# Patient Record
Sex: MALE | Race: WHITE | NOT HISPANIC OR LATINO | Employment: OTHER | URBAN - METROPOLITAN AREA
[De-identification: names, ages, dates, MRNs, and addresses within clinical notes are randomized per-mention and may not be internally consistent; named-entity substitution may affect disease eponyms.]

---

## 2018-05-07 PROBLEM — M25.612 SHOULDER STIFFNESS, LEFT: Status: ACTIVE | Noted: 2018-05-07

## 2018-05-07 PROBLEM — G89.29 CHRONIC LEFT SHOULDER PAIN: Status: ACTIVE | Noted: 2018-05-07

## 2018-05-07 PROBLEM — M75.122 COMPLETE ROTATOR CUFF TEAR OF LEFT SHOULDER: Status: ACTIVE | Noted: 2018-05-07

## 2018-05-07 PROBLEM — M25.512 CHRONIC LEFT SHOULDER PAIN: Status: ACTIVE | Noted: 2018-05-07

## 2018-05-22 PROBLEM — E66.3 OVERWEIGHT: Status: ACTIVE | Noted: 2018-05-22

## 2018-06-08 PROBLEM — R73.02 IMPAIRED GLUCOSE TOLERANCE: Status: ACTIVE | Noted: 2018-06-08

## 2018-11-27 PROBLEM — R19.8 RECTAL TENESMUS: Status: ACTIVE | Noted: 2018-11-27

## 2020-03-26 ENCOUNTER — TELEPHONE (OUTPATIENT)
Dept: CARDIOLOGY | Facility: PHYSICIAN GROUP | Age: 65
End: 2020-03-26

## 2020-04-24 ENCOUNTER — APPOINTMENT (OUTPATIENT)
Dept: CARDIOLOGY | Facility: PHYSICIAN GROUP | Age: 65
End: 2020-04-24
Payer: MEDICARE

## 2020-06-05 ENCOUNTER — OFFICE VISIT (OUTPATIENT)
Dept: CARDIOLOGY | Facility: PHYSICIAN GROUP | Age: 65
End: 2020-06-05
Payer: MEDICARE

## 2020-06-05 VITALS
BODY MASS INDEX: 27.31 KG/M2 | OXYGEN SATURATION: 97 % | DIASTOLIC BLOOD PRESSURE: 82 MMHG | HEIGHT: 64 IN | HEART RATE: 64 BPM | SYSTOLIC BLOOD PRESSURE: 130 MMHG | WEIGHT: 160 LBS

## 2020-06-05 DIAGNOSIS — E66.3 OVERWEIGHT: ICD-10-CM

## 2020-06-05 DIAGNOSIS — R01.1 HEART MURMUR: ICD-10-CM

## 2020-06-05 DIAGNOSIS — Z82.49 FAMILY HISTORY OF CORONARY ARTERY DISEASE IN FATHER: ICD-10-CM

## 2020-06-05 DIAGNOSIS — R53.83 OTHER FATIGUE: ICD-10-CM

## 2020-06-05 DIAGNOSIS — Z82.79 FAMILY HISTORY OF BICUSPID AORTIC VALVE: ICD-10-CM

## 2020-06-05 DIAGNOSIS — E78.00 PURE HYPERCHOLESTEROLEMIA: ICD-10-CM

## 2020-06-05 DIAGNOSIS — R73.02 IMPAIRED GLUCOSE TOLERANCE: ICD-10-CM

## 2020-06-05 DIAGNOSIS — R06.09 DOE (DYSPNEA ON EXERTION): ICD-10-CM

## 2020-06-05 DIAGNOSIS — G93.31 POST VIRAL SYNDROME: ICD-10-CM

## 2020-06-05 PROCEDURE — 99204 OFFICE O/P NEW MOD 45 MIN: CPT | Performed by: INTERNAL MEDICINE

## 2020-06-05 RX ORDER — ROSUVASTATIN CALCIUM 40 MG/1
40 TABLET, COATED ORAL DAILY
Qty: 90 TAB | Refills: 3 | Status: SHIPPED | OUTPATIENT
Start: 2020-06-05 | End: 2021-06-23

## 2020-06-05 ASSESSMENT — FIBROSIS 4 INDEX: FIB4 SCORE: 1.47

## 2020-06-05 NOTE — LETTER
[facility]   [address] [place] [address]  Phone: [phone]  Fax: [phone]              Hiro Dempsey  1955    Encounter Date: 2020    Summer Meredith M.D.          PROGRESS NOTE:  Subjective:   Chief Complaint:   Chief Complaint   Patient presents with   • Heart Murmur     Previous patient    • Fatigue   • Shortness of Breath     slightly        Hiro Dempsey is a 65 y.o. male who returns today for hyperlipidemia, impaired fasting glucose, heart murmur.    Previously saw cardiology in 2015 after murmur heard on pre op PE.  Echo  with aortic valve sclerosis.  Normal treadmill stress test in .    Had flu in December, since then significant change.   Used to kite in water for 20 min, now with significant fatigue.  Has to stop when walking 1 mile, has to sit 10 min.  Used to be able to jog then mow the lawn.  Some MORGAN but mostly overwhelming fatigue.  No LE edema, no victoria orthopnea.    He is not limited by chest pain, pressure or tightness with activity.   No significant palpitations, lightheadedness, or presyncope/syncope.   Mild leg claudication when going uphill but more diffuse muscles, likely not claudication.   No stroke/TIA like symptoms.    Has HLP, no meds, , HDL protective.  Has HTN, no meds.  .  No family history of premature coronary artery disease.  Father had bicuspid aortic valve.  MI at 65, CABG with AV replacement. Lived to 80s.  PGF  of heart disease in 60s.    Smoked in high school, quit at 18.  No history of diabetes. Probably IFG.  No history of autoimmune disease such as lupus or rheumatoid arthritis.  No chronic kidney disease.  No ETOH overuse, social only.  Some caffeine overuse, 4-5 cups of tea daily.    , lives in [place].  [place] child, born in [place], grew up [place], [place].  Retired Vet.    DATA REVIEWED by me:  ECG 2015  Sinus, 59, normal ECG    [facility] [place]  3/5/2015  EF 7075%, aortic valve sclerosis, mild concentric LVH    Treadmill stress test [facility] 2/11/2015  Javon protocol, 13 minutes and 30 seconds, normal blood pressure response, no symptoms, no ECG changes    Most recent labs:       Lab Results   Component Value Date/Time    HEMOGLOBIN 15.7 06/27/2018 09:17 AM    HEMATOCRIT 45.9 06/27/2018 09:17 AM    MCV 90.5 06/27/2018 09:17 AM      Lab Results   Component Value Date/Time    SODIUM 143 06/07/2018 08:25 AM    POTASSIUM 4.0 06/07/2018 08:25 AM    CHLORIDE 106 06/07/2018 08:25 AM    CO2 27 06/07/2018 08:25 AM    GLUCOSE 107 (H) 06/07/2018 08:25 AM    BUN 12 06/07/2018 08:25 AM    CREATININE 1.1 06/07/2018 08:25 AM      Lab Results   Component Value Date/Time    ASTSGOT 20 06/07/2018 08:25 AM    ALTSGPT 31 06/07/2018 08:25 AM    ALBUMIN 3.8 06/07/2018 08:25 AM      Lab Results   Component Value Date/Time    CHOLSTRLTOT 232 (H) 06/07/2018 08:25 AM     (H) 06/07/2018 08:25 AM    HDL 44.0 06/07/2018 08:25 AM    TRIGLYCERIDE 97 06/07/2018 08:25 AM           Past Medical History:   Diagnosis Date   • Basal cell carcinoma of skin     Reports skin cancer   • Cancer (HCC)     skin   • GERD (gastroesophageal reflux disease)    • Heart burn    • Heart murmur     denies cp or sob   • History of EKG 10/7/13   • History of gastric ulcer    • Hypoglycemia 2/20/2015   • Pain     Chronic R shoulder pain   • Seasonal allergies    • Seizure (HCC)     X 2 1991, from fever   • Snoring      Past Surgical History:   Procedure Laterality Date   • SHOULDER ARTHROSCOPY W/ ROTATOR CUFF REPAIR Left 4/19/2018    Procedure: LT SHOULDER ARTHROSCOPY,  ARTHROSCOPIC SUBACROMIAL DECOMPRESSION, DISTAL CLAVICLE EXCISION;  Surgeon: Lele Bray M.D.;  Location: [facility];  Service: Orthopedics   • SHOULDER ARTHROSCOPY  10/18/2013    Performed by Lele Bray M.D. at [facility]   • SHOULDER ARTHROTOMY  10/18/2013    Performed by  Lele Bray M.D. at [facility]   • SHOULDER DECOMPRESSION ARTHROSCOPIC  10/18/2013    Performed by Lele Bray M.D. at [facility]   • CLAVICLE DISTAL EXCISION  10/18/2013    Performed by Lele Bray M.D. at SUR[facility]   • TUMOR EXCISION WITH BIOPSY  2013    BASEL CELL   • BASAL CELL EXCISION     • SHOULDER SURGERY  10/18/12   • ULCER DEBRIDEMENT     • CLOSED REDUCTION      TIB FIB     Family History   Problem Relation Age of Onset   • Genetic Disorder Mother    • Arthritis Mother    • Psychiatric Illness Father    • Heart Disease Father         congenital bicuspid heart valve   • Heart Failure Father    • Heart Attack Father 83     Social History     Socioeconomic History   • Marital status:      Spouse name: Alice   • Number of children: 2   • Years of education: Not on file   • Highest education level: Not on file   Occupational History   • Occupation: veterenarian     Employer: [employer]   Social Needs   • Financial resource strain: Not on file   • Food insecurity     Worry: Not on file     Inability: Not on file   • Transportation needs     Medical: Not on file     Non-medical: Not on file   Tobacco Use   • Smoking status: Former Smoker     Packs/day: 0.50     Years: 5.00     Pack years: 2.50     Types: Cigarettes     Last attempt to quit: 10/12/1974     Years since quittin.6   • Smokeless tobacco: Never Used   Substance and Sexual Activity   • Alcohol use: Yes     Alcohol/week: 2.0 oz     Types: 4 Standard drinks or equivalent per week     Comment: 2 /week   • Drug use: No   • Sexual activity: Not on file   Lifestyle   • Physical activity     Days per week: Not on file     Minutes per session: Not on file   • Stress: Not on file   Relationships   • Social connections     Talks on phone: Not on file     Gets together: Not on file     Attends Jew service: Not on file     Active member of club or  "organization: Not on file     Attends meetings of clubs or organizations: Not on file     Relationship status: Not on file   • Intimate partner violence     Fear of current or ex partner: Not on file     Emotionally abused: Not on file     Physically abused: Not on file     Forced sexual activity: Not on file   Other Topics Concern   • Not on file   Social History Narrative   • Not on file     Allergies   Allergen Reactions   • Sulfa Drugs        Current Outpatient Medications   Medication Sig Dispense Refill   • Methylsulfonylmethane (MSM PO) Take  by mouth.     • rosuvastatin (CRESTOR) 40 MG tablet Take 1 Tab by mouth every day. 90 Tab 3   • fluticasone (FLONASE) 50 MCG/ACT nasal spray Spray 1 Spray in nose every day.     • Loratadine (CLARITIN) 10 MG Cap Take  by mouth.       No current facility-administered medications for this visit.        ROS  All others systems reviewed and negative.     Objective:     /82 (BP Location: Left arm, Patient Position: Sitting, BP Cuff Size: Adult)   Pulse 64   Ht 1.626 m (5' 4\")   Wt 72.6 kg (160 lb)   SpO2 97%  Body mass index is 27.46 kg/m².    General: No acute distress. Well nourished.  HEENT: EOM grossly intact, no scleral icterus, no pharyngeal erythema.   Neck:  No JVD, no bruits, trachea midline  CVS: RRR. Normal S1, S2. 2/6 mid peaking syst murmur, No LE edema.  2+ radial pulses, 2+ PT pulses  Resp: CTAB. No wheezing or crackles/rhonchi. Normal respiratory effort.  Abdomen: Soft, NT, no victoria hepatomegaly.  MSK/Ext: No clubbing or cyanosis.  Skin: Warm and dry, no rashes.  Neurological: CN III-XII grossly intact. No focal deficits. Mild [place]  Psych: A&O x 3, appropriate affect, good judgement      Assessment:     1. Heart murmur  EC-ECHOCARDIOGRAM COMPLETE W/O CONT   2. Pure hypercholesterolemia  Lipid Profile   3. Impaired glucose tolerance     4. Overweight     5. Other fatigue  EC-ECHOCARDIOGRAM COMPLETE W/O CONT    TSH WITH REFLEX TO FT4   6. MORGAN (dyspnea " on exertion)  EC-ECHOCARDIOGRAM COMPLETE W/O CONT    EKG    EC-ECHOCARDIOGRAM COMPLETE W/O CONT    Comp Metabolic Panel    TSH WITH REFLEX TO FT4   7. Family history of bicuspid aortic valve  EC-ECHOCARDIOGRAM COMPLETE W/O CONT   8. Family history of coronary artery disease in father     9. Post viral syndrome  CBC WITH DIFFERENTIAL       Medical Decision Making:  Today's Assessment / Status / Plan:     -Blood work, see below  -ECG  -Echo  -Lipids, needs statin, 10 year risk 15%  -Murmur sounds like bicuspid but only mild to mod stenosis at most  -If he has bicuspid, then discussed both kids should be screened    Written instructions given today:      -Echocardiogram- heart pictures to look at the heart structure and pump function. Look for bicuspid aortic valve.    -Fasting blood work, CBC, CMP, lipids, TSH    -I am going to recommend statin for primary prevention of heart attack/stroke.  -Crestor 40 mg daily, stop this medication and call me for myalgias.    -You should always hear results of testing within 5 days with my interpretation, if you do not, call the [place] office: [phone].    -We could consider treadmill stress test in [place].    Return in about 4 weeks (around 7/3/2020).    It is my pleasure to participate in the care of Mr. Dempsey.  Please do not hesitate to contact me with questions or concerns.    Summer Meredith MD, [facility]  Cardiologist [facility]    Please note that this dictation was created using voice recognition software. I have made every reasonable attempt to correct obvious errors, but it is possible there are errors of grammar and possibly content that I did not discover before finalizing the note.      Maria Fernanda Guzman, P.A.-C.  [address]  [place] [address]  VIA In Basket

## 2020-06-05 NOTE — PROGRESS NOTES
Subjective:   Chief Complaint:   Chief Complaint   Patient presents with   • Heart Murmur     Previous patient    • Fatigue   • Shortness of Breath     slightly        Hiro Dempsey is a 65 y.o. male who returns today for hyperlipidemia, impaired fasting glucose, heart murmur.    Previously saw cardiology in 2015 after murmur heard on pre op PE.  Echo 2015 with aortic valve sclerosis.  Normal treadmill stress test in .    Had flu in December, since then significant change.   Used to kite in water for 20 min, now with significant fatigue.  Has to stop when walking 1 mile, has to sit 10 min.  Used to be able to jog then mow the lawn.  Some MORGAN but mostly overwhelming fatigue.  No LE edema, no victoria orthopnea.    He is not limited by chest pain, pressure or tightness with activity.   No significant palpitations, lightheadedness, or presyncope/syncope.   Mild leg claudication when going uphill but more diffuse muscles, likely not claudication.   No stroke/TIA like symptoms.    Has HLP, no meds, , HDL protective.  Has HTN, no meds.  .  No family history of premature coronary artery disease.  Father had bicuspid aortic valve.  MI at 65, CABG with AV replacement. Lived to 80s.  PGF  of heart disease in 60s.    Smoked in high school, quit at 18.  No history of diabetes. Probably IFG.  No history of autoimmune disease such as lupus or rheumatoid arthritis.  No chronic kidney disease.  No ETOH overuse, social only.  Some caffeine overuse, 4-5 cups of tea daily.    , lives in [place].  [place] child, born in [place], grew up on [place], [place].  Retired Vet.    DATA REVIEWED by me:  ECG 2015  Sinus, 59, normal ECG    Echo [place] 3/5/2015  EF 7075%, aortic valve sclerosis, mild concentric LVH    Treadmill stress test [facility] 2015  Javon protocol, 13 minutes and 30 seconds, normal blood pressure response, no symptoms, no ECG changes    Most recent labs:       Lab Results    Component Value Date/Time    HEMOGLOBIN 15.7 06/27/2018 09:17 AM    HEMATOCRIT 45.9 06/27/2018 09:17 AM    MCV 90.5 06/27/2018 09:17 AM      Lab Results   Component Value Date/Time    SODIUM 143 06/07/2018 08:25 AM    POTASSIUM 4.0 06/07/2018 08:25 AM    CHLORIDE 106 06/07/2018 08:25 AM    CO2 27 06/07/2018 08:25 AM    GLUCOSE 107 (H) 06/07/2018 08:25 AM    BUN 12 06/07/2018 08:25 AM    CREATININE 1.1 06/07/2018 08:25 AM      Lab Results   Component Value Date/Time    ASTSGOT 20 06/07/2018 08:25 AM    ALTSGPT 31 06/07/2018 08:25 AM    ALBUMIN 3.8 06/07/2018 08:25 AM      Lab Results   Component Value Date/Time    CHOLSTRLTOT 232 (H) 06/07/2018 08:25 AM     (H) 06/07/2018 08:25 AM    HDL 44.0 06/07/2018 08:25 AM    TRIGLYCERIDE 97 06/07/2018 08:25 AM           Past Medical History:   Diagnosis Date   • Basal cell carcinoma of skin     Reports skin cancer   • Cancer (HCC)     skin   • GERD (gastroesophageal reflux disease)    • Heart burn    • Heart murmur     denies cp or sob   • History of EKG 10/7/13   • History of gastric ulcer    • Hypoglycemia 2/20/2015   • Pain     Chronic R shoulder pain   • Seasonal allergies    • Seizure (HCC)     X 2 1991, from fever   • Snoring      Past Surgical History:   Procedure Laterality Date   • SHOULDER ARTHROSCOPY W/ ROTATOR CUFF REPAIR Left 4/19/2018    Procedure: LT SHOULDER ARTHROSCOPY,  ARTHROSCOPIC SUBACROMIAL DECOMPRESSION, DISTAL CLAVICLE EXCISION;  Surgeon: Lele Bray M.D.;  Location: [facility];  Service: Orthopedics   • SHOULDER ARTHROSCOPY  10/18/2013    Performed by Lele Bray M.D. at [facility]   • SHOULDER ARTHROTOMY  10/18/2013    Performed by Lele Bray M.D. at [facility]   • SHOULDER DECOMPRESSION ARTHROSCOPIC  10/18/2013    Performed by Lele Bray M.D. at [facility]   • CLAVICLE DISTAL EXCISION  10/18/2013    Performed by Lele [facility] M.D. at [facility]  [facility]   • TUMOR EXCISION WITH BIOPSY  2013    BASEL CELL   • BASAL CELL EXCISION     • SHOULDER SURGERY  10/18/12   • ULCER DEBRIDEMENT     • CLOSED REDUCTION      TIB FIB     Family History   Problem Relation Age of Onset   • Genetic Disorder Mother    • Arthritis Mother    • Psychiatric Illness Father    • Heart Disease Father         congenital bicuspid heart valve   • Heart Failure Father    • Heart Attack Father 83     Social History     Socioeconomic History   • Marital status:      Spouse name: Alice   • Number of children: 2   • Years of education: Not on file   • Highest education level: Not on file   Occupational History   • Occupation: veterenarian     Employer: [employer]   Social Needs   • Financial resource strain: Not on file   • Food insecurity     Worry: Not on file     Inability: Not on file   • Transportation needs     Medical: Not on file     Non-medical: Not on file   Tobacco Use   • Smoking status: Former Smoker     Packs/day: 0.50     Years: 5.00     Pack years: 2.50     Types: Cigarettes     Last attempt to quit: 10/12/1974     Years since quittin.6   • Smokeless tobacco: Never Used   Substance and Sexual Activity   • Alcohol use: Yes     Alcohol/week: 2.0 oz     Types: 4 Standard drinks or equivalent per week     Comment: 2 /week   • Drug use: No   • Sexual activity: Not on file   Lifestyle   • Physical activity     Days per week: Not on file     Minutes per session: Not on file   • Stress: Not on file   Relationships   • Social connections     Talks on phone: Not on file     Gets together: Not on file     Attends Jew service: Not on file     Active member of club or organization: Not on file     Attends meetings of clubs or organizations: Not on file     Relationship status: Not on file   • Intimate partner violence     Fear of current or ex partner: Not on file     Emotionally abused: Not on file     Physically abused: Not on file  "    Forced sexual activity: Not on file   Other Topics Concern   • Not on file   Social History Narrative   • Not on file     Allergies   Allergen Reactions   • Sulfa Drugs        Current Outpatient Medications   Medication Sig Dispense Refill   • Methylsulfonylmethane (MSM PO) Take  by mouth.     • rosuvastatin (CRESTOR) 40 MG tablet Take 1 Tab by mouth every day. 90 Tab 3   • fluticasone (FLONASE) 50 MCG/ACT nasal spray Spray 1 Spray in nose every day.     • Loratadine (CLARITIN) 10 MG Cap Take  by mouth.       No current facility-administered medications for this visit.        ROS  All others systems reviewed and negative.     Objective:     /82 (BP Location: Left arm, Patient Position: Sitting, BP Cuff Size: Adult)   Pulse 64   Ht 1.626 m (5' 4\")   Wt 72.6 kg (160 lb)   SpO2 97%  Body mass index is 27.46 kg/m².    General: No acute distress. Well nourished.  HEENT: EOM grossly intact, no scleral icterus, no pharyngeal erythema.   Neck:  No JVD, no bruits, trachea midline  CVS: RRR. Normal S1, S2. 2/6 mid peaking syst murmur, No LE edema.  2+ radial pulses, 2+ PT pulses  Resp: CTAB. No wheezing or crackles/rhonchi. Normal respiratory effort.  Abdomen: Soft, NT, no victoria hepatomegaly.  MSK/Ext: No clubbing or cyanosis.  Skin: Warm and dry, no rashes.  Neurological: CN III-XII grossly intact. No focal deficits. Mild [place]  Psych: A&O x 3, appropriate affect, good judgement      Assessment:     1. Heart murmur  EC-ECHOCARDIOGRAM COMPLETE W/O CONT   2. Pure hypercholesterolemia  Lipid Profile   3. Impaired glucose tolerance     4. Overweight     5. Other fatigue  EC-ECHOCARDIOGRAM COMPLETE W/O CONT    TSH WITH REFLEX TO FT4   6. MORGAN (dyspnea on exertion)  EC-ECHOCARDIOGRAM COMPLETE W/O CONT    EKG    EC-ECHOCARDIOGRAM COMPLETE W/O CONT    Comp Metabolic Panel    TSH WITH REFLEX TO FT4   7. Family history of bicuspid aortic valve  EC-ECHOCARDIOGRAM COMPLETE W/O CONT   8. Family history of coronary artery " disease in father     9. Post viral syndrome  CBC WITH DIFFERENTIAL       Medical Decision Making:  Today's Assessment / Status / Plan:     -Blood work, see below  -ECG  -Echo  -Lipids, needs statin, 10 year risk 15%  -Murmur sounds like bicuspid but only mild to mod stenosis at most  -If he has bicuspid, then discussed both kids should be screened    Written instructions given today:      -Echocardiogram- heart pictures to look at the heart structure and pump function. Look for bicuspid aortic valve.    -Fasting blood work, CBC, CMP, lipids, TSH    -I am going to recommend statin for primary prevention of heart attack/stroke.  -Crestor 40 mg daily, stop this medication and call me for myalgias.    -You should always hear results of testing within 5 days with my interpretation, if you do not, call the [place] office: [phone].    -We could consider treadmill stress test in [place].    Return in about 4 weeks (around 7/3/2020).    It is my pleasure to participate in the care of Mr. Dempsey.  Please do not hesitate to contact me with questions or concerns.    Summer Meredith MD, [facility]  Cardiologist [facility]    Please note that this dictation was created using voice recognition software. I have made every reasonable attempt to correct obvious errors, but it is possible there are errors of grammar and possibly content that I did not discover before finalizing the note.

## 2020-06-05 NOTE — PATIENT INSTRUCTIONS
-Echocardiogram- heart pictures to look at the heart structure and pump function. Look for bicuspid aortic valve.    -Fasting blood work, CBC, CMP, lipids, TSH    -I am going to recommend statin for primary prevention of heart attack/stroke.  -Crestor 40 mg daily, stop this medication and call me for myalgias.    -You should always hear results of testing within 5 days with my interpretation, if you do not, call the [place] office: [phone].    -We could consider treadmill stress test in [place].

## 2020-06-10 ENCOUNTER — TELEPHONE (OUTPATIENT)
Dept: CARDIOLOGY | Facility: MEDICAL CENTER | Age: 65
End: 2020-06-10

## 2020-06-10 DIAGNOSIS — R06.09 DOE (DYSPNEA ON EXERTION): ICD-10-CM

## 2020-06-10 DIAGNOSIS — E66.3 OVERWEIGHT: ICD-10-CM

## 2020-06-10 DIAGNOSIS — E78.00 PURE HYPERCHOLESTEROLEMIA: ICD-10-CM

## 2020-06-10 DIAGNOSIS — Z82.79 FAMILY HISTORY OF BICUSPID AORTIC VALVE: ICD-10-CM

## 2020-06-10 DIAGNOSIS — Z82.49 FAMILY HISTORY OF CORONARY ARTERY DISEASE IN FATHER: ICD-10-CM

## 2020-06-17 ENCOUNTER — ANCILLARY PROCEDURE (OUTPATIENT)
Dept: CARDIOLOGY | Facility: IMAGING CENTER | Age: 65
End: 2020-06-17
Attending: INTERNAL MEDICINE
Payer: MEDICARE

## 2020-06-17 DIAGNOSIS — R06.09 DOE (DYSPNEA ON EXERTION): ICD-10-CM

## 2020-06-17 DIAGNOSIS — R01.1 HEART MURMUR: ICD-10-CM

## 2020-06-17 DIAGNOSIS — Z82.79 FAMILY HISTORY OF BICUSPID AORTIC VALVE: ICD-10-CM

## 2020-06-17 PROCEDURE — 93306 TTE W/DOPPLER COMPLETE: CPT | Performed by: INTERNAL MEDICINE

## 2020-06-19 LAB
LV EJECT FRACT  99904: 65
LV EJECT FRACT MOD 2C 99903: 69.56
LV EJECT FRACT MOD 4C 99902: 61.79
LV EJECT FRACT MOD BP 99901: 66.9

## 2020-06-29 ENCOUNTER — TELEPHONE (OUTPATIENT)
Dept: CARDIOLOGY | Facility: MEDICAL CENTER | Age: 65
End: 2020-06-29

## 2020-06-29 NOTE — TELEPHONE ENCOUNTER
Result Notes for EC-ECHOCARDIOGRAM COMPLETE W/O CONT     Notes recorded by Summer Meredith M.D. on 6/26/2020 at 10:04 PM PDT   Pls let him know echo shows be probably has an abnormal heart valve, bicuspid aortic valve is most likely.  The function of the valve is only midlyd impaired so we don't need to do anything right now but watch it and repeat echo in about 2 years.   Tx,   LS      Pt called. No answer, voicemail left with request for call back and contact information.

## 2020-07-02 NOTE — TELEPHONE ENCOUNTER
Pt returns call.  warm transfers call. Pt given results per MD note. Pt asks for EF #. This is given. Pt confirms 7/8 f/u appt.

## 2020-07-08 ENCOUNTER — OFFICE VISIT (OUTPATIENT)
Dept: CARDIOLOGY | Facility: PHYSICIAN GROUP | Age: 65
End: 2020-07-08
Payer: MEDICARE

## 2020-07-08 VITALS
HEIGHT: 64 IN | HEART RATE: 70 BPM | BODY MASS INDEX: 28 KG/M2 | WEIGHT: 164 LBS | DIASTOLIC BLOOD PRESSURE: 78 MMHG | OXYGEN SATURATION: 95 % | SYSTOLIC BLOOD PRESSURE: 122 MMHG

## 2020-07-08 DIAGNOSIS — R01.1 HEART MURMUR: ICD-10-CM

## 2020-07-08 DIAGNOSIS — E78.00 PURE HYPERCHOLESTEROLEMIA: ICD-10-CM

## 2020-07-08 DIAGNOSIS — R06.09 DOE (DYSPNEA ON EXERTION): ICD-10-CM

## 2020-07-08 DIAGNOSIS — Q23.1 BICUSPID AORTIC VALVE: ICD-10-CM

## 2020-07-08 PROCEDURE — 99214 OFFICE O/P EST MOD 30 MIN: CPT | Performed by: NURSE PRACTITIONER

## 2020-07-08 ASSESSMENT — ENCOUNTER SYMPTOMS
NAUSEA: 0
PND: 0
PALPITATIONS: 0
DIZZINESS: 0
FEVER: 0
COUGH: 0
MYALGIAS: 0
LOSS OF CONSCIOUSNESS: 0
ABDOMINAL PAIN: 0
HEADACHES: 0
ORTHOPNEA: 0
BRUISES/BLEEDS EASILY: 0
CHILLS: 0
INSOMNIA: 0
SHORTNESS OF BREATH: 1

## 2020-07-08 ASSESSMENT — FIBROSIS 4 INDEX: FIB4 SCORE: 1.86

## 2020-07-08 NOTE — PROGRESS NOTES
Chief Complaint   Patient presents with   • Follow-Up   • Heart Murmur   • Hyperlipidemia       Subjective:   Hiro Dempsey is a 65 y.o. male who presents today for one month follow-up of heart murmur and hyperlipidemia.    Hiro is a 65 year old male with history of heart murmur and hyperlipidemia, previously followed by Dr. Pineda, and most recently seen by Dr. Meredith a month ago. Echocardiogram was ordered, and Crestor 40mg once daily was added for primary prevention.    He is here today for follow-up. He is tolerating the Crestor without any problems. No chest pain, pressure or discomfort; occasional very mild shortness of breath when he exerts himself, but no orthopnea or PND; no symptomatic palpitations. No dizziness or syncope; no LE edema. BP is stable.     Past Medical History:   Diagnosis Date   • Basal cell carcinoma of skin     Reports skin cancer   • Bicuspid aortic valve 06/2020    Echocardiogram with normal LV size, LVEF 65%. Normal RA, LA and RV. Trace MR. Biscuspid aortic valve with mild AS (peak 23mmHg, mean 14mmHg, Vmax 2.4m/s, MAL 1.6cm2).    • Cancer (HCC)     skin   • GERD (gastroesophageal reflux disease)    • Heart burn    • Heart murmur     denies cp or sob   • History of EKG 10/7/13   • History of gastric ulcer    • Hypoglycemia 2/20/2015   • Pain     Chronic R shoulder pain   • Seasonal allergies    • Seizure (HCC)     X 2 1991, from fever   • Snoring      Past Surgical History:   Procedure Laterality Date   • SHOULDER ARTHROSCOPY W/ ROTATOR CUFF REPAIR Left 4/19/2018    Procedure: LT SHOULDER ARTHROSCOPY,  ARTHROSCOPIC SUBACROMIAL DECOMPRESSION, DISTAL CLAVICLE EXCISION;  Surgeon: Lele Bray M.D.;  Location: [facility];  Service: Orthopedics   • SHOULDER ARTHROSCOPY  10/18/2013    Performed by Lele Bray M.D. at [facility]   • SHOULDER ARTHROTOMY  10/18/2013    Performed by Lele Bray M.D. at [facility]   • SHOULDER  DECOMPRESSION ARTHROSCOPIC  10/18/2013    Performed by Lele Bray M.D. at [facility]   • CLAVICLE DISTAL EXCISION  10/18/2013    Performed by Lele Bray M.D. at SUR[facility]ERY [facility]   • TUMOR EXCISION WITH BIOPSY  2013    BASEL CELL   • BASAL CELL EXCISION     • SHOULDER SURGERY  10/18/12   • ULCER DEBRIDEMENT     • CLOSED REDUCTION      TIB FIB     Family History   Problem Relation Age of Onset   • Genetic Disorder Mother    • Arthritis Mother    • Psychiatric Illness Father    • Heart Disease Father         congenital bicuspid heart valve   • Heart Failure Father    • Heart Attack Father 83     Social History     Socioeconomic History   • Marital status:      Spouse name: Alice   • Number of children: 2   • Years of education: Not on file   • Highest education level: Not on file   Occupational History   • Occupation: veterenarian     Employer: [employer]   Social Needs   • Financial resource strain: Not on file   • Food insecurity     Worry: Not on file     Inability: Not on file   • Transportation needs     Medical: Not on file     Non-medical: Not on file   Tobacco Use   • Smoking status: Former Smoker     Packs/day: 0.50     Years: 5.00     Pack years: 2.50     Types: Cigarettes     Last attempt to quit: 10/12/1974     Years since quittin.7   • Smokeless tobacco: Never Used   Substance and Sexual Activity   • Alcohol use: Yes     Alcohol/week: 2.0 oz     Types: 4 Standard drinks or equivalent per week     Comment: 2 /week   • Drug use: No   • Sexual activity: Not on file   Lifestyle   • Physical activity     Days per week: Not on file     Minutes per session: Not on file   • Stress: Not on file   Relationships   • Social connections     Talks on phone: Not on file     Gets together: Not on file     Attends Mosque service: Not on file     Active member of club or organization: Not on file     Attends meetings of clubs or  "organizations: Not on file     Relationship status: Not on file   • Intimate partner violence     Fear of current or ex partner: Not on file     Emotionally abused: Not on file     Physically abused: Not on file     Forced sexual activity: Not on file   Other Topics Concern   • Not on file   Social History Narrative   • Not on file     Allergies   Allergen Reactions   • Sulfa Drugs      Outpatient Encounter Medications as of 7/8/2020   Medication Sig Dispense Refill   • rosuvastatin (CRESTOR) 40 MG tablet Take 1 Tab by mouth every day. 90 Tab 3   • fluticasone (FLONASE) 50 MCG/ACT nasal spray Spray 1 Spray in nose every day.     • Loratadine (CLARITIN) 10 MG Cap Take  by mouth.     • [DISCONTINUED] Methylsulfonylmethane (MSM PO) Take  by mouth.       No facility-administered encounter medications on file as of 7/8/2020.      Review of Systems   Constitutional: Negative for chills and fever.   HENT: Negative for congestion.    Respiratory: Positive for shortness of breath. Negative for cough.         Very mild, with exertion.   Cardiovascular: Negative for chest pain, palpitations, orthopnea, leg swelling and PND.   Gastrointestinal: Negative for abdominal pain and nausea.   Musculoskeletal: Negative for myalgias.   Skin: Negative for rash.   Neurological: Negative for dizziness, loss of consciousness and headaches.   Endo/Heme/Allergies: Does not bruise/bleed easily.   Psychiatric/Behavioral: The patient does not have insomnia.         Objective:   /78 (BP Location: Left arm, Patient Position: Sitting)   Pulse 70   Ht 1.626 m (5' 4\")   Wt 74.4 kg (164 lb)   SpO2 95%   BMI 28.15 kg/m²     Physical Exam   Constitutional: He is oriented to person, place, and time. He appears well-developed and well-nourished.   HENT:   Head: Normocephalic.   Eyes: EOM are normal.   Neck: Normal range of motion. Neck supple. No JVD present.   Cardiovascular: Normal rate and regular rhythm.   Murmur heard.   Systolic murmur is " present with a grade of 2/6.  Murmur at RUSB.   Pulmonary/Chest: Effort normal and breath sounds normal. No respiratory distress. He has no wheezes. He has no rales.   Abdominal: Soft. Bowel sounds are normal. He exhibits no distension. There is no abdominal tenderness.   Musculoskeletal: Normal range of motion.         General: No edema.   Neurological: He is alert and oriented to person, place, and time.   Skin: Skin is warm and dry. No rash noted.   Psychiatric: He has a normal mood and affect.     CONCLUSIONS OF ECHOCARDIOGRAM OF 6/17/2020:  No prior study is available for comparison.   Left ventricular ejection fraction is visually estimated to be 65%.  Aortic valve leaflets are calcified, cannot exclude bicuspid/quadcuspid   valve.  Mild aortic stenosis: Vmax is 2.4  m/s, MG 14 mmHg, DI 0.48, MAL 1.6   cm2.  Mild aortic insufficiency.  Ascending aorta diameter is 3.7 cm.  Unable to estimate RVSP, normal estimated RAP.    Lab Results   Component Value Date/Time    CHOLSTRLTOT 180 06/10/2020 08:58 AM     (H) 06/10/2020 08:58 AM    HDL 46.0 06/10/2020 08:58 AM    TRIGLYCERIDE 58 06/10/2020 08:58 AM       Lab Results   Component Value Date/Time    SODIUM 141 06/10/2020 08:58 AM    POTASSIUM 4.2 06/10/2020 08:58 AM    CHLORIDE 109 (H) 06/10/2020 08:58 AM    CO2 26 06/10/2020 08:58 AM    GLUCOSE 113 (H) 06/10/2020 08:58 AM    BUN 11 06/10/2020 08:58 AM    CREATININE 1.1 06/10/2020 08:58 AM     Lab Results   Component Value Date/Time    ALKPHOSPHAT 58 06/10/2020 08:58 AM    ASTSGOT 27 06/10/2020 08:58 AM    ALTSGPT 33 06/10/2020 08:58 AM    TBILIRUBIN 0.7 06/10/2020 08:58 AM        Assessment:     1. MORGAN (dyspnea on exertion)  DX-CHEST-2 VIEWS   2. Bicuspid aortic valve     3. Heart murmur     4. Pure hypercholesterolemia         Medical Decision Making:  Today's Assessment / Status / Plan:     1. Mild dyspnea on exertion, with bicuspid aortic valve with mild AS. Will check every 2 years, unless new,  progressive symptoms arise. Reviewed these with patient.    2. Heart murmur, related to mild AS, stable.    3. Hyperlipidemia, treated with Crestor 40mg once daily. To repeat labs in 2 months. Will make changes based on these results.    Same medications for now. FU with Dr. Meredith in 6-12 months, sooner if clinical condition changes.    Collaborating MD: Masoud

## 2020-07-13 ENCOUNTER — TELEPHONE (OUTPATIENT)
Dept: CARDIOLOGY | Facility: MEDICAL CENTER | Age: 65
End: 2020-07-13

## 2020-07-13 NOTE — TELEPHONE ENCOUNTER
Called pt. To advise.       Message   Received: Today   Message Contents   TIO Blake L.P.N.               Please let pt know that CXR is completely normal - good news.   Thanks, AB

## 2020-07-13 NOTE — TELEPHONE ENCOUNTER
AB/jeremy    Pt calling for thoracic x ray results done at [facility] 7/10. Please call Hiro  or , ok to talk to Alice, she has permission to speak on Hiro's behalf.

## 2020-09-15 ENCOUNTER — TELEPHONE (OUTPATIENT)
Dept: CARDIOLOGY | Facility: MEDICAL CENTER | Age: 65
End: 2020-09-15

## 2020-12-01 ENCOUNTER — TELEPHONE (OUTPATIENT)
Dept: CARDIOLOGY | Facility: MEDICAL CENTER | Age: 65
End: 2020-12-01

## 2020-12-01 NOTE — TELEPHONE ENCOUNTER
AB    AW  TO: 430p/Mon/Office  NM: Hiro Garcia   PH: [phone]   PT NM: Hiro Garcia   : [birth year]   REG DR: Dr Meredith   RE: Needs to know what type of  appointment needs to be scheduled.  Needs to have blood work done prior.

## 2020-12-01 NOTE — TELEPHONE ENCOUNTER
Kasia's 7-8-20 OV dictation noted that pt. Can schedule FV in 6-12 months with Dr. Meredith.   Dr. Meredith ordered a LP in June which hasn't been drawn yet.  Left detailed message for pt. To call [phone] to schedule FV with LS in [place] or [facility] and that lab order will be mailed to him..

## 2021-01-27 ENCOUNTER — TELEPHONE (OUTPATIENT)
Dept: CARDIOLOGY | Facility: MEDICAL CENTER | Age: 66
End: 2021-01-27

## 2021-01-27 NOTE — TELEPHONE ENCOUNTER
Summer Meredith M.D.  Beti Marin, R.N.             M,   Please let him know his LDL cholesterol is down to 61 which is excellent.  Thank you.      LVM on pt's personal VM informing him of results.

## 2021-06-23 RX ORDER — ROSUVASTATIN CALCIUM 40 MG/1
TABLET, COATED ORAL
Qty: 90 TABLET | Refills: 3 | Status: SHIPPED | OUTPATIENT
Start: 2021-06-23 | End: 2022-06-08 | Stop reason: SDUPTHER

## 2021-07-28 ENCOUNTER — OFFICE VISIT (OUTPATIENT)
Dept: CARDIOLOGY | Facility: PHYSICIAN GROUP | Age: 66
End: 2021-07-28
Payer: MEDICARE

## 2021-07-28 VITALS
WEIGHT: 153 LBS | SYSTOLIC BLOOD PRESSURE: 112 MMHG | HEART RATE: 50 BPM | HEIGHT: 64 IN | OXYGEN SATURATION: 97 % | DIASTOLIC BLOOD PRESSURE: 60 MMHG | BODY MASS INDEX: 26.12 KG/M2

## 2021-07-28 DIAGNOSIS — R01.1 HEART MURMUR: ICD-10-CM

## 2021-07-28 DIAGNOSIS — E78.00 PURE HYPERCHOLESTEROLEMIA: ICD-10-CM

## 2021-07-28 DIAGNOSIS — Z82.79 FAMILY HISTORY OF BICUSPID AORTIC VALVE: ICD-10-CM

## 2021-07-28 DIAGNOSIS — Z82.49 FAMILY HISTORY OF CORONARY ARTERY DISEASE IN FATHER: ICD-10-CM

## 2021-07-28 DIAGNOSIS — G93.31 POST VIRAL SYNDROME: ICD-10-CM

## 2021-07-28 DIAGNOSIS — R73.02 IMPAIRED GLUCOSE TOLERANCE: ICD-10-CM

## 2021-07-28 DIAGNOSIS — Q23.1 BICUSPID AORTIC VALVE: ICD-10-CM

## 2021-07-28 DIAGNOSIS — R05.3 CHRONIC COUGH: ICD-10-CM

## 2021-07-28 PROCEDURE — 99214 OFFICE O/P EST MOD 30 MIN: CPT | Performed by: INTERNAL MEDICINE

## 2021-07-28 RX ORDER — ROSUVASTATIN CALCIUM 40 MG/1
TABLET, COATED ORAL
COMMUNITY
Start: 2021-06-23 | End: 2021-07-28

## 2021-07-28 RX ORDER — CLOTRIMAZOLE AND BETAMETHASONE DIPROPIONATE 10; .64 MG/G; MG/G
CREAM TOPICAL
COMMUNITY
Start: 2021-07-24 | End: 2021-12-28

## 2021-07-28 RX ORDER — OFLOXACIN 3 MG/ML
10 SOLUTION AURICULAR (OTIC) DAILY
COMMUNITY
Start: 2021-07-24 | End: 2021-07-31

## 2021-07-28 RX ORDER — OFLOXACIN 3 MG/ML
SOLUTION AURICULAR (OTIC)
COMMUNITY
Start: 2021-07-27 | End: 2021-07-28

## 2021-07-28 RX ORDER — ALBUTEROL SULFATE 90 UG/1
2 AEROSOL, METERED RESPIRATORY (INHALATION) EVERY 6 HOURS PRN
Qty: 8.5 G | Refills: 1 | Status: SHIPPED
Start: 2021-07-28 | End: 2022-04-26

## 2021-07-28 RX ORDER — COLISTIN SULFATE, NEOMYCIN SULFATE, THONZONIUM BROMIDE AND HYDROCORTISONE ACETATE 3; 3.3; .5; 1 MG/ML; MG/ML; MG/ML; MG/ML
SUSPENSION AURICULAR (OTIC)
COMMUNITY
Start: 2021-07-26 | End: 2021-12-28

## 2021-07-28 ASSESSMENT — FIBROSIS 4 INDEX: FIB4 SCORE: 1.64

## 2021-07-28 NOTE — PATIENT INSTRUCTIONS
-I hear a murmur and your aortic valve does not look normal.  I cannot tell if it is a bicuspid aortic valve or even quadricuspid aortic valve.  Someday we will perform a transesophageal echocardiogram, AKA ORA, this will get the probe closer to your heart so we can see the valve better.  This can be done with deep sedation with an anesthesiologist or moderate sedation with Versed and fentanyl.  Your choice and I can arrange it either way.  We are not in a hurry.    -The valve is functioning sufficiently so there is nothing to be done right now except keep checking echos and watch for symptoms.  I can also keep listening to your heart valve every time I see you.    -Someday if the valve is stuck down (aortic stenosis) or leaks too much (aortic regurgitation) we will get you a new heart valve.    -Symptoms would include slowing down, shortness of breath activity but feeling fine at rest, lightheadedness, chest pains.  Our goal is to monitor you closely so you never get as sick as her father was when he got his new heart valve.    -At some point you will want your adult children to be screened, listening for murmur and eventually an echocardiogram.  Do this when they are well-established in life and have their health insurance and long-term disability lined up.    -Here is my unsolicited advice about your cough:  -Use a nasal steroid spray every day such as Flonase, any over-the-counter  -Try albuterol.  Technically albuterol as a rescue inhaler.  See if it impacts your cough in any way.  You can use it up to 4 times a day.  -Ras KNOWLES is a small packet of essentially salt that you add with distilled water into a spray and clean out your entire sinus passage.  This is been extremely effective for people with cough and allergies.

## 2021-07-28 NOTE — PROGRESS NOTES
Subjective:   Chief Complaint:   Chief Complaint   Patient presents with   • Heart Murmur     F/V DX:Heart murmur   • Shortness of Breath       Hiro Dempsey is a 66 y.o. male who returns today for hyperlipidemia, impaired fasting glucose, heart murmur and a chronic cough.  Previously saw cardiology in  after murmur heard on pre op PE.  Echo  with aortic valve sclerosis.  Normal treadmill stress test in .    Had flu in 2019 (did not have flu shot), since then significant change.   Used to kite in water for 20 min, now with significant fatigue.  Has to stop when walking 1 mile, has to sit 10 min.  Used to be able to jog then mow the lawn.  Some MORGAN but mostly overwhelming fatigue.  Having a chronic cough.  No LE edema, no victoria orthopnea.    He is not limited by chest pain, pressure or tightness with activity.   No significant palpitations, lightheadedness, or presyncope/syncope.   Mild leg claudication when going uphill but more diffuse muscles, likely not claudication.   No stroke/TIA like symptoms.    Has HLP, no meds, , HDL protective.  On statin, LDL dropped to 61.    No HTN, no meds.    No family history of premature coronary artery disease.  Father had bicuspid aortic valve.  MI at 65, CABG with AV replacement. Lived to 80s.  PGF  of heart disease in 60s.    Smoked in high school, quit at 18.  No history of diabetes. Probably IFG.  No history of autoimmune disease such as lupus or rheumatoid arthritis.  No chronic kidney disease.  No ETOH overuse, social only.  Some caffeine overuse, 4-5 cups of tea daily.    , lives in [place].  [place] child, born in [place], grew up on [place].  Retired [employer].    DATA REVIEWED by me:  ECG 2015  Sinus, 59, normal ECG    Echo 2020  No prior study is available for comparison.   Left ventricular ejection fraction is visually estimated to be 65%.  Aortic valve leaflets are calcified, cannot exclude bicuspid/quadcuspid   valve.  Mild  aortic stenosis: Vmax is 2.4  m/s, MG 14 mmHg, DI 0.48, MAL 1.6   cm2.  Mild aortic insufficiency.  Ascending aorta diameter is 3.7 cm.  Unable to estimate RVSP, normal estimated RAP.    Echo [place] 3/5/2015  EF 70-75%, aortic valve sclerosis, mild concentric LVH    Treadmill stress test [facility] 2/11/2015  Javon protocol, 13 minutes and 30 seconds, normal blood pressure response, no symptoms, no ECG changes    Most recent labs:     7 2621 sodium 139, LFTs normal, creatinine 0.92, hemoglobin 14, platelets 162  Lab Results   Component Value Date/Time    HEMOGLOBIN 15.5 06/10/2020 12:00 AM    HEMATOCRIT 46.4 06/10/2020 12:00 AM    MCV 93.2 06/10/2020 12:00 AM      Lab Results   Component Value Date/Time    SODIUM 141 06/10/2020 08:58 AM    POTASSIUM 4.2 06/10/2020 08:58 AM    CHLORIDE 109 (H) 06/10/2020 08:58 AM    CO2 26 06/10/2020 08:58 AM    GLUCOSE 113 (H) 06/10/2020 08:58 AM    BUN 11 06/10/2020 08:58 AM    CREATININE 1.1 06/10/2020 08:58 AM      Lab Results   Component Value Date/Time    ASTSGOT 28 09/11/2020 12:00 PM    ALTSGPT 47 09/11/2020 12:00 PM    ALBUMIN 4.1 09/11/2020 12:00 PM      Lab Results   Component Value Date/Time    CHOLSTRLTOT 123 01/25/2021 11:22 AM    LDL 61 01/25/2021 11:22 AM    HDL 51.0 01/25/2021 11:22 AM    TRIGLYCERIDE 55 01/25/2021 11:22 AM           Past Medical History:   Diagnosis Date   • Basal cell carcinoma of skin     Reports skin cancer   • Bicuspid aortic valve 06/2020    Echocardiogram with normal LV size, LVEF 65%. Normal RA, LA and RV. Trace MR. Biscuspid aortic valve with mild AS (peak 23mmHg, mean 14mmHg, Vmax 2.4m/s, MAL 1.6cm2).    • Cancer (HCC)     skin   • GERD (gastroesophageal reflux disease)    • Heart burn    • Heart murmur     denies cp or sob   • History of EKG 10/7/13   • History of gastric ulcer    • Hypoglycemia 2/20/2015   • Pain     Chronic R shoulder pain   • Seasonal allergies    • Seizure (HCC)     X 2 1991, from fever   •  Snoring      Past Surgical History:   Procedure Laterality Date   • SHOULDER ARTHROSCOPY W/ ROTATOR CUFF REPAIR Left 2018    Procedure: LT SHOULDER ARTHROSCOPY,  ARTHROSCOPIC SUBACROMIAL DECOMPRESSION, DISTAL CLAVICLE EXCISION;  Surgeon: Lele Bray M.D.;  Location: [facility];  Service: Orthopedics   • SHOULDER ARTHROSCOPY  10/18/2013    Performed by Lele Bray M.D. at [facility]   • SHOULDER ARTHROTOMY  10/18/2013    Performed by Lele Bray M.D. at [facility]   • SHOULDER DECOMPRESSION ARTHROSCOPIC  10/18/2013    Performed by Lele Bray M.D. at [facility]   • CLAVICLE DISTAL EXCISION  10/18/2013    Performed by Lele Bray M.D. at [facility]   • TUMOR EXCISION WITH BIOPSY  2013    BASEL CELL   • BASAL CELL EXCISION     • SHOULDER SURGERY  10/18/12   • ULCER DEBRIDEMENT     • CLOSED REDUCTION      TIB FIB     Family History   Problem Relation Age of Onset   • Genetic Disorder Mother    • Arthritis Mother    • Psychiatric Illness Father    • Heart Disease Father         congenital bicuspid heart valve   • Heart Failure Father    • Heart Attack Father 83     Social History     Socioeconomic History   • Marital status:      Spouse name: Alice   • Number of children: 2   • Years of education: Not on file   • Highest education level: Not on file   Occupational History   • Occupation: veterenarian     Employer: [employer]   Tobacco Use   • Smoking status: Former Smoker     Packs/day: 0.50     Years: 5.00     Pack years: 2.50     Types: Cigarettes     Quit date: 10/12/1974     Years since quittin.8   • Smokeless tobacco: Never Used   Substance and Sexual Activity   • Alcohol use: Yes     Alcohol/week: 2.0 oz     Types: 4 Standard drinks or equivalent per week     Comment: 2 /week   • Drug use: No   • Sexual activity: Not on file   Other Topics Concern   • Not on  file   Social History Narrative   • Not on file     Social Determinants of Health     Financial Resource Strain:    • Difficulty of Paying Living Expenses:    Food Insecurity:    • Worried About Running Out of Food in the Last Year:    • Ran Out of Food in the Last Year:    Transportation Needs:    • Lack of Transportation (Medical):    • Lack of Transportation (Non-Medical):    Physical Activity:    • Days of Exercise per Week:    • Minutes of Exercise per Session:    Stress:    • Feeling of Stress :    Social Connections:    • Frequency of Communication with Friends and Family:    • Frequency of Social Gatherings with Friends and Family:    • Attends Religion Services:    • Active Member of Clubs or Organizations:    • Attends Club or Organization Meetings:    • Marital Status:    Intimate Partner Violence:    • Fear of Current or Ex-Partner:    • Emotionally Abused:    • Physically Abused:    • Sexually Abused:      Allergies   Allergen Reactions   • Sulfa Drugs        Current Outpatient Medications   Medication Sig Dispense Refill   • clotrimazole-betamethasone (LOTRISONE) 1-0.05 % Cream Apply  topically.     • neomycin/colistin/thonz/HC (CORTISPORIN-TC) 3.3-3-10-0.5 MG/ML Suspension Instill 5 gtts in affected ear three to four time daily for 10 days.     • ofloxacin otic sol (FLOXIN OTIC) 0.3 % Solution Administer 10 Drops into affected ear(s) every day.     • albuterol 108 (90 Base) MCG/ACT Aero Soln inhalation aerosol Inhale 2 Puffs every 6 hours as needed for Shortness of Breath. 8.5 g 1   • rosuvastatin (CRESTOR) 40 MG tablet TAKE ONE TABLET BY MOUTH DAILY 90 tablet 3   • fluticasone (FLONASE) 50 MCG/ACT nasal spray Spray 1 Spray in nose every day.     • Loratadine (CLARITIN) 10 MG Cap Take  by mouth.       No current facility-administered medications for this visit.       ROS    All others systems reviewed and negative.     Objective:     /60 (BP Location: Left arm, Patient Position: Sitting, BP  "Cuff Size: Adult)   Pulse (!) 50   Ht 1.626 m (5' 4\")   Wt 69.4 kg (153 lb)   SpO2 97%  Body mass index is 26.26 kg/m².    General: No acute distress. Well nourished.  HEENT: EOM grossly intact, no scleral icterus, no pharyngeal erythema.   Neck:  No JVD, no bruits, trachea midline  CVS: RRR. Normal S1, S2. 2/6 early to mid peaking syst murmur, No LE edema.  2+ radial pulses, 2+ PT pulses  Resp: CTAB. No wheezing or crackles/rhonchi. Normal respiratory effort.  Abdomen: Soft, NT, no victoria hepatomegaly.  MSK/Ext: No clubbing or cyanosis.  Skin: Warm and dry, no rashes.  Neurological: CN III-XII grossly intact. No focal deficits. Mild [place]  Psych: A&O x 3, appropriate affect, good judgement    Physical exam performed today and unchanged, except what is noted, compared to 6-5-2020    Assessment:     1. Bicuspid aortic valve  EC-ECHOCARDIOGRAM COMPLETE W/O CONT   2. Heart murmur  EC-ECHOCARDIOGRAM COMPLETE W/O CONT   3. Pure hypercholesterolemia     4. Family history of bicuspid aortic valve     5. Family history of coronary artery disease in father     6. Impaired glucose tolerance     7. Post viral syndrome     8. Chronic cough         Medical Decision Making:  Today's Assessment / Status / Plan:     -Working on his cough, I will give him albuterol and he can report back to Dr. Bowling if this is helping  -No significant shortness of breath  -His fatigue has mostly resolved  -Lipids, needs statin, 10 year risk 15%, on primary prevention statin, LDL dropped from 122 down to 61.  -Looks like bicuspid or quadricuspid aortic valve, will repeat echo in 2 years, June 2022  -I would recommend his children be screened at some point  -Return in 1 year with echo just prior    Written instructions given today:      -I hear a murmur and your aortic valve does not look normal.  I cannot tell if it is a bicuspid aortic valve or even quadricuspid aortic valve.  Someday we will perform a transesophageal echocardiogram, AKA ORA, " this will get the probe closer to your heart so we can see the valve better.  This can be done with deep sedation with an anesthesiologist or moderate sedation with Versed and fentanyl.  Your choice and I can arrange it either way.  We are not in a hurry.    -The valve is functioning sufficiently so there is nothing to be done right now except keep checking echos and watch for symptoms.  I can also keep listening to your heart valve every time I see you.    -Someday if the valve is stuck down (aortic stenosis) or leaks too much (aortic regurgitation) we will get you a new heart valve.    -Symptoms would include slowing down, shortness of breath activity but feeling fine at rest, lightheadedness, chest pains.  Our goal is to monitor you closely so you never get as sick as her father was when he got his new heart valve.    -At some point you will want your adult children to be screened, listening for murmur and eventually an echocardiogram.  Do this when they are well-established in life and have their health insurance and long-term disability lined up.    -Here is my unsolicited advice about your cough:  -Use a nasal steroid spray every day such as Flonase, any over-the-counter  -Try albuterol.  Technically albuterol as a rescue inhaler.  See if it impacts your cough in any way.  You can use it up to 4 times a day.  -Ras KNOWLES is a small packet of essentially salt that you add with distilled water into a spray and clean out your entire sinus passage.  This is been extremely effective for people with cough and allergies.        Return in about 1 year (around 7/28/2022).    It is my pleasure to participate in the care of Mr. Dempsey.  Please do not hesitate to contact me with questions or concerns.    Summer Meredith MD, [facility]  Cardiologist [facility]    Please note that this dictation was created using voice recognition software. I have made every reasonable attempt to correct obvious  errors, but it is possible there are errors of grammar and possibly content that I did not discover before finalizing the note.

## 2021-07-28 NOTE — LETTER
[facility]   [address] [place] [address]  Phone: [phone]  Fax: [phone]              Hiro Dempsey  1955    Encounter Date: 2021    Summer Meredith M.D.          PROGRESS NOTE:  Subjective:   Chief Complaint:   Chief Complaint   Patient presents with   • Heart Murmur     F/V DX:Heart murmur   • Shortness of Breath       Hiro Dempsey is a 66 y.o. male who returns today for hyperlipidemia, impaired fasting glucose, heart murmur and a chronic cough.  Previously saw cardiology in 2015 after murmur heard on pre op PE.  Echo  with aortic valve sclerosis.  Normal treadmill stress test in .    Had flu in 2019 (did not have flu shot), since then significant change.   Used to kite in water for 20 min, now with significant fatigue.  Has to stop when walking 1 mile, has to sit 10 min.  Used to be able to jog then mow the lawn.  Some MORGAN but mostly overwhelming fatigue.  Having a chronic cough.  No LE edema, no victoria orthopnea.    He is not limited by chest pain, pressure or tightness with activity.   No significant palpitations, lightheadedness, or presyncope/syncope.   Mild leg claudication when going uphill but more diffuse muscles, likely not claudication.   No stroke/TIA like symptoms.    Has HLP, no meds, , HDL protective.  On statin, LDL dropped to 61.    No HTN, no meds.    No family history of premature coronary artery disease.  Father had bicuspid aortic valve.  MI at 65, CABG with AV replacement. Lived to 80s.  PGF  of heart disease in 60s.    Smoked in high school, quit at 18.  No history of diabetes. Probably IFG.  No history of autoimmune disease such as lupus or rheumatoid arthritis.  No chronic kidney disease.  No ETOH overuse, social only.  Some caffeine overuse, 4-5 cups of tea daily.    , lives in [place].  Rosalia child, born in [place], grew up on [place], [place].  Retired  [employer].    DATA REVIEWED by me:  ECG 2/11/2015  Sinus, 59, normal ECG    Echo 6-  No prior study is available for comparison.   Left ventricular ejection fraction is visually estimated to be 65%.  Aortic valve leaflets are calcified, cannot exclude bicuspid/quadcuspid   valve.  Mild aortic stenosis: Vmax is 2.4  m/s, MG 14 mmHg, DI 0.48, MAL 1.6   cm2.  Mild aortic insufficiency.  Ascending aorta diameter is 3.7 cm.  Unable to estimate RVSP, normal estimated RAP.    Echo Alpha 3/5/2015  EF 70-75%, aortic valve sclerosis, mild concentric LVH    Treadmill stress test [facility] 2/11/2015  Javon protocol, 13 minutes and 30 seconds, normal blood pressure response, no symptoms, no ECG changes    Most recent labs:     7 2621 sodium 139, LFTs normal, creatinine 0.92, hemoglobin 14, platelets 162  Lab Results   Component Value Date/Time    HEMOGLOBIN 15.5 06/10/2020 12:00 AM    HEMATOCRIT 46.4 06/10/2020 12:00 AM    MCV 93.2 06/10/2020 12:00 AM      Lab Results   Component Value Date/Time    SODIUM 141 06/10/2020 08:58 AM    POTASSIUM 4.2 06/10/2020 08:58 AM    CHLORIDE 109 (H) 06/10/2020 08:58 AM    CO2 26 06/10/2020 08:58 AM    GLUCOSE 113 (H) 06/10/2020 08:58 AM    BUN 11 06/10/2020 08:58 AM    CREATININE 1.1 06/10/2020 08:58 AM      Lab Results   Component Value Date/Time    ASTSGOT 28 09/11/2020 12:00 PM    ALTSGPT 47 09/11/2020 12:00 PM    ALBUMIN 4.1 09/11/2020 12:00 PM      Lab Results   Component Value Date/Time    CHOLSTRLTOT 123 01/25/2021 11:22 AM    LDL 61 01/25/2021 11:22 AM    HDL 51.0 01/25/2021 11:22 AM    TRIGLYCERIDE 55 01/25/2021 11:22 AM           Past Medical History:   Diagnosis Date   • Basal cell carcinoma of skin     Reports skin cancer   • Bicuspid aortic valve 06/2020    Echocardiogram with normal LV size, LVEF 65%. Normal RA, LA and RV. Trace MR. Biscuspid aortic valve with mild AS (peak 23mmHg, mean 14mmHg, Vmax 2.4m/s, MAL 1.6cm2).    • Cancer (HCC)     skin   •  GERD (gastroesophageal reflux disease)    • Heart burn    • Heart murmur     denies cp or sob   • History of EKG 10/7/13   • History of gastric ulcer    • Hypoglycemia 2/20/2015   • Pain     Chronic R shoulder pain   • Seasonal allergies    • Seizure (HCC)     X 2 1991, from fever   • Snoring      Past Surgical History:   Procedure Laterality Date   • SHOULDER ARTHROSCOPY W/ ROTATOR CUFF REPAIR Left 4/19/2018    Procedure: LT SHOULDER ARTHROSCOPY,  ARTHROSCOPIC SUBACROMIAL DECOMPRESSION, DISTAL CLAVICLE EXCISION;  Surgeon: Lele Bray M.D.;  Location: [facility];  Service: Orthopedics   • SHOULDER ARTHROSCOPY  10/18/2013    Performed by Lele Bray M.D. at [facility]   • SHOULDER ARTHROTOMY  10/18/2013    Performed by Lele Bray M.D. at [facility]   • SHOULDER DECOMPRESSION ARTHROSCOPIC  10/18/2013    Performed by Lele Bray M.D. at [facility]   • CLAVICLE DISTAL EXCISION  10/18/2013    Performed by Lele Bray M.D. at [facility]   • TUMOR EXCISION WITH BIOPSY  5/2013    BASEL CELL   • BASAL CELL EXCISION  2013   • SHOULDER SURGERY  10/18/12   • ULCER DEBRIDEMENT  1997   • CLOSED REDUCTION  1991    TIB FIB     Family History   Problem Relation Age of Onset   • Genetic Disorder Mother    • Arthritis Mother    • Psychiatric Illness Father    • Heart Disease Father         congenital bicuspid heart valve   • Heart Failure Father    • Heart Attack Father 83     Social History     Socioeconomic History   • Marital status:      Spouse name: Alice   • Number of children: 2   • Years of education: Not on file   • Highest education level: Not on file   Occupational History   • Occupation: veterenarian     Employer: [employer]   Tobacco Use   • Smoking status: Former Smoker     Packs/day: 0.50     Years: 5.00     Pack years: 2.50     Types: Cigarettes     Quit date: 10/12/1974     Years since  quittin.8   • Smokeless tobacco: Never Used   Substance and Sexual Activity   • Alcohol use: Yes     Alcohol/week: 2.0 oz     Types: 4 Standard drinks or equivalent per week     Comment: 2 /week   • Drug use: No   • Sexual activity: Not on file   Other Topics Concern   • Not on file   Social History Narrative   • Not on file     Social Determinants of Health     Financial Resource Strain:    • Difficulty of Paying Living Expenses:    Food Insecurity:    • Worried About Running Out of Food in the Last Year:    • Ran Out of Food in the Last Year:    Transportation Needs:    • Lack of Transportation (Medical):    • Lack of Transportation (Non-Medical):    Physical Activity:    • Days of Exercise per Week:    • Minutes of Exercise per Session:    Stress:    • Feeling of Stress :    Social Connections:    • Frequency of Communication with Friends and Family:    • Frequency of Social Gatherings with Friends and Family:    • Attends Alevism Services:    • Active Member of Clubs or Organizations:    • Attends Club or Organization Meetings:    • Marital Status:    Intimate Partner Violence:    • Fear of Current or Ex-Partner:    • Emotionally Abused:    • Physically Abused:    • Sexually Abused:      Allergies   Allergen Reactions   • Sulfa Drugs        Current Outpatient Medications   Medication Sig Dispense Refill   • clotrimazole-betamethasone (LOTRISONE) 1-0.05 % Cream Apply  topically.     • neomycin/colistin/thonz/HC (CORTISPORIN-TC) 3.3-3-10-0.5 MG/ML Suspension Instill 5 gtts in affected ear three to four time daily for 10 days.     • ofloxacin otic sol (FLOXIN OTIC) 0.3 % Solution Administer 10 Drops into affected ear(s) every day.     • albuterol 108 (90 Base) MCG/ACT Aero Soln inhalation aerosol Inhale 2 Puffs every 6 hours as needed for Shortness of Breath. 8.5 g 1   • rosuvastatin (CRESTOR) 40 MG tablet TAKE ONE TABLET BY MOUTH DAILY 90 tablet 3   • fluticasone (FLONASE) 50 MCG/ACT nasal spray Spray 1  "Spray in nose every day.     • Loratadine (CLARITIN) 10 MG Cap Take  by mouth.       No current facility-administered medications for this visit.       ROS    All others systems reviewed and negative.     Objective:     /60 (BP Location: Left arm, Patient Position: Sitting, BP Cuff Size: Adult)   Pulse (!) 50   Ht 1.626 m (5' 4\")   Wt 69.4 kg (153 lb)   SpO2 97%  Body mass index is 26.26 kg/m².    General: No acute distress. Well nourished.  HEENT: EOM grossly intact, no scleral icterus, no pharyngeal erythema.   Neck:  No JVD, no bruits, trachea midline  CVS: RRR. Normal S1, S2. 2/6 early to mid peaking syst murmur, No LE edema.  2+ radial pulses, 2+ PT pulses  Resp: CTAB. No wheezing or crackles/rhonchi. Normal respiratory effort.  Abdomen: Soft, NT, no victoria hepatomegaly.  MSK/Ext: No clubbing or cyanosis.  Skin: Warm and dry, no rashes.  Neurological: CN III-XII grossly intact. No focal deficits. Mild [place]  Psych: A&O x 3, appropriate affect, good judgement    Physical exam performed today and unchanged, except what is noted, compared to 6-5-2020    Assessment:     1. Bicuspid aortic valve  EC-ECHOCARDIOGRAM COMPLETE W/O CONT   2. Heart murmur  EC-ECHOCARDIOGRAM COMPLETE W/O CONT   3. Pure hypercholesterolemia     4. Family history of bicuspid aortic valve     5. Family history of coronary artery disease in father     6. Impaired glucose tolerance     7. Post viral syndrome     8. Chronic cough         Medical Decision Making:  Today's Assessment / Status / Plan:     -Working on his cough, I will give him albuterol and he can report back to Dr. Bowling if this is helping  -No significant shortness of breath  -His fatigue has mostly resolved  -Lipids, needs statin, 10 year risk 15%, on primary prevention statin, LDL dropped from 122 down to 61.  -Looks like bicuspid or quadricuspid aortic valve, will repeat echo in 2 years, June 2022  -I would recommend his children be screened at some point  -Return in 1 " year with echo just prior    Written instructions given today:      -I hear a murmur and your aortic valve does not look normal.  I cannot tell if it is a bicuspid aortic valve or even quadricuspid aortic valve.  Someday we will perform a transesophageal echocardiogram, AKA ORA, this will get the probe closer to your heart so we can see the valve better.  This can be done with deep sedation with an anesthesiologist or moderate sedation with Versed and fentanyl.  Your choice and I can arrange it either way.  We are not in a hurry.    -The valve is functioning sufficiently so there is nothing to be done right now except keep checking echos and watch for symptoms.  I can also keep listening to your heart valve every time I see you.    -Someday if the valve is stuck down (aortic stenosis) or leaks too much (aortic regurgitation) we will get you a new heart valve.    -Symptoms would include slowing down, shortness of breath activity but feeling fine at rest, lightheadedness, chest pains.  Our goal is to monitor you closely so you never get as sick as her father was when he got his new heart valve.    -At some point you will want your adult children to be screened, listening for murmur and eventually an echocardiogram.  Do this when they are well-established in life and have their health insurance and long-term disability lined up.    -Here is my unsolicited advice about your cough:  -Use a nasal steroid spray every day such as Flonase, any over-the-counter  -Try albuterol.  Technically albuterol as a rescue inhaler.  See if it impacts your cough in any way.  You can use it up to 4 times a day.  -Ras KNOWLES is a small packet of essentially salt that you add with distilled water into a spray and clean out your entire sinus passage.  This is been extremely effective for people with cough and allergies.        Return in about 1 year (around 7/28/2022).    It is my pleasure to participate in the care of Mr. Dempsey.  Please do  not hesitate to contact me with questions or concerns.    Summer Meredith MD, [facility]  Cardiologist [facility]    Please note that this dictation was created using voice recognition software. I have made every reasonable attempt to correct obvious errors, but it is possible there are errors of grammar and possibly content that I did not discover before finalizing the note.        CAM JohnstonO.  [address] [place] [address] [address]  [place] [address]  Via Fax: [phone]

## 2021-08-09 ENCOUNTER — TELEPHONE (OUTPATIENT)
Dept: CARDIOLOGY | Facility: MEDICAL CENTER | Age: 66
End: 2021-08-09

## 2021-08-09 NOTE — TELEPHONE ENCOUNTER
LS    Pt called stating he spoke to LS about having an esophogeal echocardiogram. Pt states the wrong echo was ordered. Pt can be reached at [phone].    Thank you

## 2021-08-10 DIAGNOSIS — Q23.1 BICUSPID AORTIC VALVE: ICD-10-CM

## 2021-08-10 NOTE — TELEPHONE ENCOUNTER
I was planning to have a regular echocardiogram 2/9/2022 to keep an eye on the functional status of the valve.    At some point we need a ORA also.  If he is ready to move onto the ORA and skip the regular echo this year, we could do that.    If he agrees, please change the order to a ORA and send a message to [place]    Thank you

## 2021-08-10 NOTE — TELEPHONE ENCOUNTER
Spoke with pt. He would like to skip the regular echo this year and have a transesophageal echo in Feb. 2022.   Order entered.   TEOFILO to [place].

## 2021-08-11 NOTE — TELEPHONE ENCOUNTER
Called patient in regards to his ORA and he will call me in May to schedule. He doesn't want this done until June of 2022.

## 2021-09-22 ENCOUNTER — TELEPHONE (OUTPATIENT)
Dept: CARDIOLOGY | Facility: MEDICAL CENTER | Age: 66
End: 2021-09-22

## 2021-09-23 ENCOUNTER — TELEPHONE (OUTPATIENT)
Dept: CARDIOLOGY | Facility: MEDICAL CENTER | Age: 66
End: 2021-09-23

## 2021-09-23 ENCOUNTER — HOSPITAL ENCOUNTER (OUTPATIENT)
Facility: MEDICAL CENTER | Age: 66
End: 2021-09-23
Attending: INTERNAL MEDICINE | Admitting: INTERNAL MEDICINE
Payer: MEDICARE

## 2021-09-23 NOTE — TELEPHONE ENCOUNTER
Per patients request and requesting this day 1-11-22 and ok with another Dr. Chau, patient is scheduled for a ORA w/anesthesia with Dr. Mcpherson. No meds to stop and patient to check in at 12:00 for a 2:00 procedure. Updated H&P to be done on admit by NP. Pre admit to call patient.

## 2021-09-23 NOTE — TELEPHONE ENCOUNTER
This is Dr. Meredith's patient. I am fine doing the ORA if she is not available, but it would be best to have it done by the patient's primary if possible. Can you check with Dr. Meredith if she is okay with me doing this? I see the patient is okay with it, I just want to make sure Dr. Meredith is as well.    Thanks.

## 2021-12-28 ENCOUNTER — PRE-ADMISSION TESTING (OUTPATIENT)
Dept: ADMISSIONS | Facility: MEDICAL CENTER | Age: 66
End: 2021-12-28
Attending: INTERNAL MEDICINE
Payer: MEDICARE

## 2021-12-28 RX ORDER — IBUPROFEN 400 MG/1
400 TABLET ORAL EVERY 6 HOURS PRN
COMMUNITY

## 2022-01-11 ENCOUNTER — APPOINTMENT (OUTPATIENT)
Dept: CARDIOLOGY | Facility: MEDICAL CENTER | Age: 67
End: 2022-01-11
Attending: INTERNAL MEDICINE
Payer: MEDICARE

## 2022-01-11 NOTE — OR NURSING
COVID-19 Pre-surgery screenin. Do you have an undiagnosed respiratory illness or symptoms such as coughing or sneezing?  noOnset of Sx   2. Acute vs. chronic respiratory illness      2. Do you have an unexplained fever greater than 100.4 degrees Fahrenheit or 38 degrees Celsius?   no     3. Have you had direct exposure to a patient who tested positive for Covid-19?  no     4. Have you traveled within the last 14 days to [place], [place], [place], [place], or [place]?  No just to [place]     Pt informed of visitor and mask policy   No pertinent family history in first degree relatives

## 2022-01-11 NOTE — TELEPHONE ENCOUNTER
Patients procedure was cancelled due to the RN out ill and patient said he is leaving the country and will call back to reschedule in April when he gets back. I did try calling patient back to see if he could come in on 1-12-22 when we found out we had coverage and I haven't heard back from patient.

## 2022-04-29 ENCOUNTER — TELEPHONE (OUTPATIENT)
Dept: CARDIOLOGY | Facility: MEDICAL CENTER | Age: 67
End: 2022-04-29
Payer: MEDICARE

## 2022-04-29 NOTE — TELEPHONE ENCOUNTER
----- Message from Rosendo Guaman M.D. sent at 4/26/2022 11:07 AM PDT -----  Regarding: RE: Sedation ?  Sure.  ----- Message -----  From: [place] Guerra  Sent: 4/26/2022  11:02 AM PDT  To: Rosendo Guaman M.D.  Subject: Sedation ?                                       Can this patient be done with cons sed?  ----- Message -----  From: Rosendo Guaman M.D.  Sent: 4/26/2022  10:23 AM PDT  To: [place] Atkinson,    Saw this patient in [place].  Can you please schedule him for ORA?    Thank you.    KM

## 2022-04-29 NOTE — TELEPHONE ENCOUNTER
Patient is scheduled on 5-16-22 for a ORA w/anesthesia with . No meds to stop and patient to check in at 10:00 for a 12:00 procedure. H&P was done on 4-26-22 by Dr. Guaman. Pre admit to call patient.

## 2022-05-09 ENCOUNTER — PRE-ADMISSION TESTING (OUTPATIENT)
Dept: ADMISSIONS | Facility: MEDICAL CENTER | Age: 67
End: 2022-05-09
Attending: INTERNAL MEDICINE
Payer: MEDICARE

## 2022-05-16 ENCOUNTER — ANESTHESIA (OUTPATIENT)
Dept: CARDIOLOGY | Facility: MEDICAL CENTER | Age: 67
End: 2022-05-16
Payer: MEDICARE

## 2022-05-16 ENCOUNTER — APPOINTMENT (OUTPATIENT)
Dept: CARDIOLOGY | Facility: MEDICAL CENTER | Age: 67
End: 2022-05-16
Attending: INTERNAL MEDICINE
Payer: MEDICARE

## 2022-05-16 ENCOUNTER — HOSPITAL ENCOUNTER (OUTPATIENT)
Facility: MEDICAL CENTER | Age: 67
End: 2022-05-16
Attending: INTERNAL MEDICINE | Admitting: INTERNAL MEDICINE
Payer: MEDICARE

## 2022-05-16 ENCOUNTER — ANESTHESIA EVENT (OUTPATIENT)
Dept: CARDIOLOGY | Facility: MEDICAL CENTER | Age: 67
End: 2022-05-16
Payer: MEDICARE

## 2022-05-16 VITALS
HEIGHT: 64 IN | HEART RATE: 55 BPM | RESPIRATION RATE: 19 BRPM | TEMPERATURE: 97.1 F | SYSTOLIC BLOOD PRESSURE: 136 MMHG | DIASTOLIC BLOOD PRESSURE: 63 MMHG | BODY MASS INDEX: 27.89 KG/M2 | OXYGEN SATURATION: 94 % | WEIGHT: 163.36 LBS

## 2022-05-16 DIAGNOSIS — Q23.1 BICUSPID AORTIC VALVE: ICD-10-CM

## 2022-05-16 PROCEDURE — 700105 HCHG RX REV CODE 258: Performed by: INTERNAL MEDICINE

## 2022-05-16 PROCEDURE — 160035 HCHG PACU - 1ST 60 MINS PHASE I

## 2022-05-16 PROCEDURE — 01922 ANES N-INVAS IMG/RADJ THER: CPT | Performed by: ANESTHESIOLOGY

## 2022-05-16 PROCEDURE — 160002 HCHG RECOVERY MINUTES (STAT)

## 2022-05-16 PROCEDURE — 700111 HCHG RX REV CODE 636 W/ 250 OVERRIDE (IP): Performed by: ANESTHESIOLOGY

## 2022-05-16 PROCEDURE — 93320 DOPPLER ECHO COMPLETE: CPT | Mod: 26 | Performed by: INTERNAL MEDICINE

## 2022-05-16 PROCEDURE — 93312 ECHO TRANSESOPHAGEAL: CPT | Mod: 26 | Performed by: INTERNAL MEDICINE

## 2022-05-16 PROCEDURE — 93325 DOPPLER ECHO COLOR FLOW MAPG: CPT | Mod: 26 | Performed by: INTERNAL MEDICINE

## 2022-05-16 PROCEDURE — 93312 ECHO TRANSESOPHAGEAL: CPT

## 2022-05-16 PROCEDURE — 160046 HCHG PACU - 1ST 60 MINS PHASE II

## 2022-05-16 RX ORDER — MIDAZOLAM HYDROCHLORIDE 1 MG/ML
1 INJECTION INTRAMUSCULAR; INTRAVENOUS
Status: DISCONTINUED | OUTPATIENT
Start: 2022-05-16 | End: 2022-05-16 | Stop reason: HOSPADM

## 2022-05-16 RX ORDER — SODIUM CHLORIDE, SODIUM LACTATE, POTASSIUM CHLORIDE, CALCIUM CHLORIDE 600; 310; 30; 20 MG/100ML; MG/100ML; MG/100ML; MG/100ML
INJECTION, SOLUTION INTRAVENOUS CONTINUOUS
Status: DISCONTINUED | OUTPATIENT
Start: 2022-05-16 | End: 2022-05-16 | Stop reason: HOSPADM

## 2022-05-16 RX ORDER — OXYCODONE HCL 5 MG/5 ML
5 SOLUTION, ORAL ORAL
Status: DISCONTINUED | OUTPATIENT
Start: 2022-05-16 | End: 2022-05-16 | Stop reason: HOSPADM

## 2022-05-16 RX ORDER — HALOPERIDOL 5 MG/ML
1 INJECTION INTRAMUSCULAR
Status: DISCONTINUED | OUTPATIENT
Start: 2022-05-16 | End: 2022-05-16 | Stop reason: HOSPADM

## 2022-05-16 RX ORDER — ALBUTEROL SULFATE 2.5 MG/3ML
2.5 SOLUTION RESPIRATORY (INHALATION)
Status: DISCONTINUED | OUTPATIENT
Start: 2022-05-16 | End: 2022-05-16 | Stop reason: HOSPADM

## 2022-05-16 RX ORDER — ONDANSETRON 2 MG/ML
4 INJECTION INTRAMUSCULAR; INTRAVENOUS
Status: DISCONTINUED | OUTPATIENT
Start: 2022-05-16 | End: 2022-05-16 | Stop reason: HOSPADM

## 2022-05-16 RX ORDER — MEPERIDINE HYDROCHLORIDE 25 MG/ML
12.5 INJECTION INTRAMUSCULAR; INTRAVENOUS; SUBCUTANEOUS
Status: DISCONTINUED | OUTPATIENT
Start: 2022-05-16 | End: 2022-05-16 | Stop reason: HOSPADM

## 2022-05-16 RX ORDER — OXYCODONE HCL 5 MG/5 ML
10 SOLUTION, ORAL ORAL
Status: DISCONTINUED | OUTPATIENT
Start: 2022-05-16 | End: 2022-05-16 | Stop reason: HOSPADM

## 2022-05-16 RX ORDER — DIPHENHYDRAMINE HYDROCHLORIDE 50 MG/ML
12.5 INJECTION INTRAMUSCULAR; INTRAVENOUS
Status: DISCONTINUED | OUTPATIENT
Start: 2022-05-16 | End: 2022-05-16 | Stop reason: HOSPADM

## 2022-05-16 RX ADMIN — PROPOFOL 50 MG: 10 INJECTION, EMULSION INTRAVENOUS at 12:54

## 2022-05-16 RX ADMIN — PROPOFOL 50 MG: 10 INJECTION, EMULSION INTRAVENOUS at 12:59

## 2022-05-16 RX ADMIN — SODIUM CHLORIDE, POTASSIUM CHLORIDE, SODIUM LACTATE AND CALCIUM CHLORIDE: 600; 310; 30; 20 INJECTION, SOLUTION INTRAVENOUS at 12:42

## 2022-05-16 RX ADMIN — PROPOFOL 50 MG: 10 INJECTION, EMULSION INTRAVENOUS at 13:03

## 2022-05-16 RX ADMIN — PROPOFOL 50 MG: 10 INJECTION, EMULSION INTRAVENOUS at 13:06

## 2022-05-16 RX ADMIN — PROPOFOL 50 MG: 10 INJECTION, EMULSION INTRAVENOUS at 12:58

## 2022-05-16 ASSESSMENT — FIBROSIS 4 INDEX
FIB4 SCORE: 1.67
FIB4 SCORE: 1.67

## 2022-05-16 ASSESSMENT — PAIN DESCRIPTION - PAIN TYPE
TYPE: SURGICAL PAIN
TYPE: SURGICAL PAIN

## 2022-05-16 ASSESSMENT — PAIN SCALES - GENERAL: PAIN_LEVEL: 0

## 2022-05-16 NOTE — ANESTHESIA TIME REPORT
Anesthesia Start and Stop Event Times     Date Time Event    5/16/2022 1239 Ready for Procedure     1242 Anesthesia Start     1320 Anesthesia Stop        Responsible Staff  05/16/22    Name Role Begin End    Hever Araujo M.D. Anesth 1242 1320        Overtime Reason:  no overtime (within assigned shift)    Comments:

## 2022-05-16 NOTE — ANESTHESIA PREPROCEDURE EVALUATION
Date/Time: 05/16/22 1200    Scheduled providers: Rosendo Guaman M.D.; Hank Nieto M.D.    Procedure: EC-ORA W/O CONT    Diagnosis: Bicuspid aortic valve [Q23.1]    Indications: Investigate abnormal bicuspid aortic valve.    Location: [facility] - ECHOCARDIOLOGY [facility]          Relevant Problems   CARDIAC   (positive) Heart murmur      GI   (positive) GERD (gastroesophageal reflux disease)       Physical Exam    Airway   Mallampati: II  TM distance: >3 FB  Neck ROM: full       Cardiovascular - normal exam  Rhythm: regular  Rate: normal  (-) murmur     Dental - normal exam           Pulmonary - normal exam  Breath sounds clear to auscultation     Abdominal    Neurological - normal exam                 Anesthesia Plan    ASA 2       Plan - general       Airway plan will be natural airway                    Informed Consent:

## 2022-05-16 NOTE — DISCHARGE INSTRUCTIONS
ACTIVITY: Rest and take it easy for the first 24 hours.  A responsible adult is recommended to remain with you during that time.  It is normal to feel sleepy.  We encourage you to not do anything that requires balance, judgment or coordination.    MILD FLU-LIKE SYMPTOMS ARE NORMAL. YOU MAY EXPERIENCE GENERALIZED MUSCLE ACHES, THROAT IRRITATION, HEADACHE AND/OR SOME NAUSEA.    FOR 24 HOURS DO NOT:  Drive, operate machinery or run household appliances.  Drink beer or alcoholic beverages.   Make important decisions or sign legal documents.    DIET: To avoid nausea, slowly advance diet as tolerated, avoiding spicy or greasy foods for the first day.  Add more substantial food to your diet according to your physician's instructions.  Babies can be fed formula or breast milk as soon as they are hungry.  INCREASE FLUIDS AND FIBER TO AVOID CONSTIPATION.    SURGICAL DRESSING/BATHING: Ok to shower/bathe tomorrow    FOLLOW-UP APPOINTMENT:  A follow-up appointment should be arranged with your doctor; call to schedule.    You should CALL YOUR PHYSICIAN if you develop:  Fever greater than 101 degrees F.  Pain not relieved by medication, or persistent nausea or vomiting.  Excessive bleeding (blood soaking through dressing) or unexpected drainage from the wound.  Extreme redness or swelling around the incision site, drainage of pus or foul smelling drainage.  Inability to urinate or empty your bladder within 8 hours.  Problems with breathing or chest pain.    You should call 911 if you develop problems with breathing or chest pain.  If you are unable to contact your doctor or surgical center, you should go to the nearest emergency room or urgent care center.      If any questions arise, call your doctor.  If your doctor is not available, please feel free to call the Surgical Center at [phone].     A registered nurse may call you a few days after your surgery to see how you are doing after your procedure.    MEDICATIONS:  Resume taking daily medication.  Take prescribed pain medication with food.  If no medication is prescribed, you may take non-aspirin pain medication if needed.  PAIN MEDICATION CAN BE VERY CONSTIPATING.  Take a stool softener or laxative such as senokot, pericolace, or milk of magnesia if needed.    If your physician has prescribed pain medication that includes Acetaminophen (Tylenol), do not take additional Acetaminophen (Tylenol) while taking the prescribed medication.    Depression / Suicide Risk    As you are discharged from this [facility] facility, it is important to learn how to keep safe from harming yourself.    Recognize the warning signs:  Abrupt changes in personality, positive or negative- including increase in energy   Giving away possessions  Change in eating patterns- significant weight changes-  positive or negative  Change in sleeping patterns- unable to sleep or sleeping all the time   Unwillingness or inability to communicate  Depression  Unusual sadness, discouragement and loneliness  Talk of wanting to die  Neglect of personal appearance   Rebelliousness- reckless behavior  Withdrawal from people/activities they love  Confusion- inability to concentrate     If you or a loved one observes any of these behaviors or has concerns about self-harm, here's what you can do:  Talk about it- your feelings and reasons for harming yourself  Remove any means that you might use to hurt yourself (examples: pills, rope, extension cords, firearm)  Get professional help from the community (Mental Health, Substance Abuse, psychological counseling)  Do not be alone:Call your Safe Contact- someone whom you trust who will be there for you.  Call your local CRISIS HOTLINE 708-0538 or 776-661-2931  Call your local Children's Mobile Crisis Response Team [place] (557) 784-0673 or www.Enmotus  Call the toll free National Suicide Prevention Hotlines   National Suicide Prevention Lifeline 057-923-KILP  (8498)  [facility] 800-SUICIDE (509-1910)

## 2022-05-16 NOTE — ANESTHESIA POSTPROCEDURE EVALUATION
Patient: Hiro Barlow    Procedure Summary     Date: 05/16/22 Room / Location: [facility] - ECHOCARDIOLOGY [facility]    Anesthesia Start: 1242 Anesthesia Stop: 1320    Procedure: EC-ORA W/O CONT Diagnosis:       Bicuspid aortic valve      (Investigate abnormal bicuspid aortic valve.)    Scheduled Providers: Rosendo Guaman M.D.; Hank Nieto M.D. Responsible Provider: Hever Araujo M.D.    Anesthesia Type: general ASA Status: 2          Final Anesthesia Type: general  Last vitals  BP   Blood Pressure : 124/58    Temp   36.4 °C (97.5 °F)    Pulse   (!) 50   Resp   18    SpO2   96 %      Anesthesia Post Evaluation    Patient location during evaluation: PACU  Patient participation: complete - patient participated  Level of consciousness: awake and alert  Pain score: 0    Airway patency: patent  Anesthetic complications: no  Cardiovascular status: hemodynamically stable  Respiratory status: acceptable  Hydration status: euvolemic    PONV: none          No complications documented.     Nurse Pain Score: 0 (NPRS)

## 2022-05-16 NOTE — OR NURSING
1319 - Pt to PACU 7 from OR.  Bedside report from anesthesiologist and RN.  Attached to monitoring, VSS, breathing is calm and unlabored. On 4L mask, no signs of pain or nausea.     1410 - Pt stable to discharge.  Instructions given, patient and wife verbalize understanding.  IV And armbands removed.  Taken via wheelchair to car.  Pt has all belongings with them.

## 2022-05-18 LAB — LV EJECT FRACT  99904: 60

## 2022-06-08 ENCOUNTER — OFFICE VISIT (OUTPATIENT)
Dept: CARDIOLOGY | Facility: PHYSICIAN GROUP | Age: 67
End: 2022-06-08
Payer: MEDICARE

## 2022-06-08 VITALS
HEIGHT: 64 IN | BODY MASS INDEX: 27.85 KG/M2 | OXYGEN SATURATION: 97 % | DIASTOLIC BLOOD PRESSURE: 80 MMHG | WEIGHT: 163.14 LBS | RESPIRATION RATE: 16 BRPM | SYSTOLIC BLOOD PRESSURE: 130 MMHG | HEART RATE: 60 BPM

## 2022-06-08 DIAGNOSIS — R53.83 OTHER FATIGUE: ICD-10-CM

## 2022-06-08 DIAGNOSIS — E78.00 PURE HYPERCHOLESTEROLEMIA: ICD-10-CM

## 2022-06-08 DIAGNOSIS — R01.1 HEART MURMUR: ICD-10-CM

## 2022-06-08 PROCEDURE — 99214 OFFICE O/P EST MOD 30 MIN: CPT | Performed by: NURSE PRACTITIONER

## 2022-06-08 RX ORDER — ROSUVASTATIN CALCIUM 40 MG/1
40 TABLET, COATED ORAL DAILY
Qty: 100 TABLET | Refills: 3 | Status: SHIPPED | OUTPATIENT
Start: 2022-06-08 | End: 2023-10-03

## 2022-06-08 ASSESSMENT — ENCOUNTER SYMPTOMS
FEVER: 0
ABDOMINAL PAIN: 0
NAUSEA: 0
CHILLS: 0
ORTHOPNEA: 0
INSOMNIA: 0
PND: 0
MYALGIAS: 0
SHORTNESS OF BREATH: 1
COUGH: 0
HEADACHES: 0
DIZZINESS: 0
LOSS OF CONSCIOUSNESS: 0
PALPITATIONS: 0
BRUISES/BLEEDS EASILY: 0

## 2022-06-08 ASSESSMENT — FIBROSIS 4 INDEX: FIB4 SCORE: 1.67

## 2022-06-08 NOTE — PROGRESS NOTES
Chief Complaint   Patient presents with   • Follow-Up   • Results   • Heart Murmur   • Hyperlipidemia       Subjective     Hiro Dempsey is a 67 y.o. male who presents today for follow-up of ORA.    Hiro is a 67 year old male with history of heart murmur and hyperlipidemia, previously followed by Dr. Pineda, and most recently seen by Dr. Meredith in July 2021. Echocardiogram was ordered, which showed possible bicuspid aortic valve.    He was seen by Dr. Guaman last month, to establish care, and ORA was recommended to rule out/in bicuspid aortic valve.     He is here today for follow-up. Overall, he is doing well: no chest pain, pressure or discomfort; occasional very mild shortness of breath when he exerts himself, but no orthopnea or PND, unchanged from previous; no symptomatic palpitations. No dizziness or syncope; no LE edema. BP is stable.     Past Medical History:   Diagnosis Date   • Basal cell carcinoma of skin     Reports skin cancer   • Cancer (HCC)     skin   • Dental disorder     2 dental implants   • GERD (gastroesophageal reflux disease)    • Heart burn    • Heart murmur 05/2022    ORA with normal LV size, LVEF 60%. Normal RA, LA and RV. Tricuspid aortic valve, mild AR.   • History of gastric ulcer    • Mixed hyperlipidemia    • Pain     Chronic R shoulder pain   • Seasonal allergies    • Seizure (HCC)     X 2 1991, from fever   • Snoring      Past Surgical History:   Procedure Laterality Date   • SHOULDER ARTHROSCOPY W/ ROTATOR CUFF REPAIR Left 4/19/2018    Procedure: LT SHOULDER ARTHROSCOPY,  ARTHROSCOPIC SUBACROMIAL DECOMPRESSION, DISTAL CLAVICLE EXCISION;  Surgeon: Lele Bray M.D.;  Location: [facility];  Service: Orthopedics   • SHOULDER ARTHROSCOPY  10/18/2013    Performed by Lele Bray M.D. at [facility]   • SHOULDER ARTHROTOMY  10/18/2013    Performed by Lele Bray M.D. at [facility]   • SHOULDER DECOMPRESSION ARTHROSCOPIC   10/18/2013    Performed by Lele Bray M.D. at SURGERY [facility]   • CLAVICLE DISTAL EXCISION  10/18/2013    Performed by Lele Bray M.D. at SURGERY [facility]   • TUMOR EXCISION WITH BIOPSY  2013    BASEL CELL   • BASAL CELL EXCISION     • SHOULDER SURGERY  10/18/12   • ULCER DEBRIDEMENT     • CLOSED REDUCTION      TIB FIB     Family History   Problem Relation Age of Onset   • Genetic Disorder Mother    • Arthritis Mother    • Psychiatric Illness Father    • Heart Disease Father         congenital bicuspid heart valve   • Heart Failure Father    • Heart Attack Father 83     Social History     Socioeconomic History   • Marital status:      Spouse name: Alice   • Number of children: 2   • Years of education: Not on file   • Highest education level: Not on file   Occupational History   • Occupation: veterenarian     Employer: [employer]   Tobacco Use   • Smoking status: Former Smoker     Packs/day: 0.50     Years: 5.00     Pack years: 2.50     Types: Cigarettes     Quit date: 10/12/1974     Years since quittin.6   • Smokeless tobacco: Never Used   Vaping Use   • Vaping Use: Never used   Substance and Sexual Activity   • Alcohol use: Yes     Alcohol/week: 2.0 oz     Types: 4 Standard drinks or equivalent per week     Comment: 2 /week   • Drug use: Yes     Types: Inhaled     Comment: marijuana occaisional   • Sexual activity: Not on file   Other Topics Concern   • Not on file   Social History Narrative   • Not on file     Social Determinants of Health     Financial Resource Strain: Not on file   Food Insecurity: Not on file   Transportation Needs: Not on file   Physical Activity: Not on file   Stress: Not on file   Social Connections: Not on file   Intimate Partner Violence: Not on file   Housing Stability: Not on file     Allergies   Allergen Reactions   • Sulfa Drugs      Not sure of the reaction     Outpatient Encounter Medications as of  "6/8/2022   Medication Sig Dispense Refill   • rosuvastatin (CRESTOR) 40 MG tablet Take 1 Tablet by mouth every day. 100 Tablet 3   • ibuprofen (MOTRIN) 400 MG Tab Take 400 mg by mouth every 6 hours as needed.     • fluticasone (FLONASE) 50 MCG/ACT nasal spray Administer 1 Spray into affected nostril(S) 1 time a day as needed.     • [DISCONTINUED] rosuvastatin (CRESTOR) 40 MG tablet TAKE ONE TABLET BY MOUTH DAILY (Patient taking differently: every evening.) 90 tablet 3     No facility-administered encounter medications on file as of 6/8/2022.     Review of Systems   Constitutional: Negative for chills and fever.   HENT: Negative for congestion.    Respiratory: Positive for shortness of breath. Negative for cough.         Very mild, with exertion.   Cardiovascular: Negative for chest pain, palpitations, orthopnea, leg swelling and PND.   Gastrointestinal: Negative for abdominal pain and nausea.   Musculoskeletal: Negative for myalgias.   Skin: Negative for rash.   Neurological: Negative for dizziness, loss of consciousness and headaches.   Endo/Heme/Allergies: Does not bruise/bleed easily.   Psychiatric/Behavioral: The patient does not have insomnia.               Objective     /80 (BP Location: Left arm, Patient Position: Sitting, BP Cuff Size: Adult)   Pulse 60   Resp 16   Ht 1.626 m (5' 4\")   Wt 74 kg (163 lb 2.3 oz)   SpO2 97%   BMI 28.00 kg/m²     Physical Exam  Constitutional:       Appearance: He is well-developed.   HENT:      Head: Normocephalic.   Neck:      Vascular: No JVD.   Cardiovascular:      Rate and Rhythm: Normal rate and regular rhythm.      Heart sounds: Murmur heard.    Systolic murmur is present with a grade of 2/6.     Comments: Murmur at RUSB.  Pulmonary:      Effort: Pulmonary effort is normal. No respiratory distress.      Breath sounds: Normal breath sounds. No wheezing or rales.   Abdominal:      General: Bowel sounds are normal. There is no distension.      Palpations: Abdomen " is soft.      Tenderness: There is no abdominal tenderness.   Musculoskeletal:         General: Normal range of motion.      Cervical back: Normal range of motion and neck supple.   Skin:     General: Skin is warm and dry.      Findings: No rash.   Neurological:      Mental Status: He is alert and oriented to person, place, and time.       CONCLUSIONS OF ORA OF 5/16/2022:  Normal left ventricular size and systolic function.  Tricuspid aortic valve.  No hemodynamically significant valvular abnormality noted.      LABS AS OF 7/26/2021:  Sodium 136 - 144 mmol/L 139    POTASSIUM 3.6 - 5.1 mmol/L 3.9    Chloride 101 - 111 mmol/L 104    CARBON DIOXIDE 22 - 32 mmol/L 25    Anion Gap 2 - 11 mmol/L 10    Calcium, S/P 8.7 - 10.3 mg/dl 9.0    Glucose, Fasting 60 - 99 mg/dl 94    BUN 8 - 20 mg/dl 14    Creatinine 0.80 - 1.40 mg/dl 0.92    Protein, Total 6.4 - 8.2 g/dl 6.6    Albumin 3.5 - 4.8 g/dl 4.0    Alkaline Phosphatase 38 - 126 IU/L 46    ALT(SGPT) 17 - 63 IU/L 21    AST 15 - 41 IU/L 20    Bilirubin, Total 0.4 - 2.0 mg/dl 1.0    BILIRUBIN, DIRECT 0.0 - 0.3 mg/dl 0.2    Indirect Bilirubin 0.0 - 1.5 mg/dl 0.8    Globulin 2.6 - 3.1 g/dl 2.6 Low     Albumin/Globulin Ratio 1.00 - 2.20 Ratio 1.54    EGFRE Calc >60 ml/min/1.7 82      Cholesterol, Total 0 - 200 mg/dl 121    TRIGLYCERIDES, S/P 0 - 150 mg/dl 42    Comment: The Adult Treatment Panel of the Center for Disease Control (CDC) recommends triglyceride values for cardiovascular risk to be:      Less than 150 mg/dl           Normal      150-199 mg/dl                 Borderline High      200-500 mg/dl                 High      >500 mg/dl                    Very High   HDL Cholesterol 40 - 59 mg/dl 44    Comment: HDL cholesterol values less than 35 mg/dl for males and 45 mg/dl for females are assocated with increased risk of coronary artery disease.   LDL, CHOL, CALC 0 - 99 mg/dl 57    TC/HDL Ratio <4.5 Ratio 2.8            Assessment & Plan     1. Heart murmur     2. Pure  hypercholesterolemia  rosuvastatin (CRESTOR) 40 MG tablet    Comp Metabolic Panel    Lipid Profile   3. Other fatigue  CBC WITH DIFFERENTIAL    TSH       Medical Decision Making: Today's Assessment/Status/Plan:      1. Heart murmur with recent ORA showing normal LV size and function, TRIcuspid aortic valve and only mild AR. Reviewed results with patient, and he is reassured. He will urge family members to get checked out, given genetic component.    2. Hyperlipidemia, treated with Crestor. To check fasting CMP and lipid panel.    3. Fatigue, to check CBC and TSH.    Same medications for now. Can follow-up with cardiology (Dr. Guaman) annually, sooner if clinical condition changes.

## 2022-07-07 ENCOUNTER — TELEPHONE (OUTPATIENT)
Dept: CARDIOLOGY | Facility: MEDICAL CENTER | Age: 67
End: 2022-07-07
Payer: MEDICARE

## 2022-07-07 NOTE — TELEPHONE ENCOUNTER
----- Message from TIO Blake sent at 7/7/2022 12:04 PM PDT -----  Labs are all good/stable. Lipids are excellent on Crestor. Please continue same medications.  Thanks, AB

## 2023-05-26 PROBLEM — R40.0 SOMNOLENCE: Status: ACTIVE | Noted: 2022-11-03

## 2023-05-26 PROBLEM — R06.2 WHEEZING: Status: ACTIVE | Noted: 2022-11-03

## 2023-05-26 PROBLEM — R73.02 IMPAIRED GLUCOSE TOLERANCE: Status: RESOLVED | Noted: 2018-06-08 | Resolved: 2023-05-26

## 2023-05-26 PROBLEM — G47.33 OBSTRUCTIVE SLEEP APNEA SYNDROME: Status: ACTIVE | Noted: 2022-11-03

## 2023-05-26 PROBLEM — Z12.5 ENCOUNTER FOR PROSTATE CANCER SCREENING: Status: ACTIVE | Noted: 2023-05-26

## 2023-05-26 PROBLEM — R05.3 CHRONIC COUGH: Status: ACTIVE | Noted: 2022-11-03

## 2023-05-26 PROBLEM — Z76.89 ENCOUNTER TO ESTABLISH CARE: Status: ACTIVE | Noted: 2023-05-26

## 2023-05-26 PROBLEM — Z79.899 ENCOUNTER FOR LONG-TERM (CURRENT) USE OF MEDICATIONS: Status: ACTIVE | Noted: 2023-05-26

## 2023-05-26 PROBLEM — R00.1 BRADYCARDIA: Status: ACTIVE | Noted: 2022-11-03

## 2023-05-26 PROBLEM — G82.50 CHRONIC INCOMPLETE QUADRIPLEGIA (HCC): Status: RESOLVED | Noted: 2023-05-26 | Resolved: 2023-05-26

## 2023-10-03 PROBLEM — R13.10 DYSPHAGIA: Status: ACTIVE | Noted: 2023-10-03

## (undated) DEVICE — SET LEADWIRE 5 LEAD BEDSIDE DISPOSABLE ECG (1SET OF 5/EA)

## (undated) DEVICE — CHLORAPREP 26 ML APPLICATOR - ORANGE TINT(25/CA)

## (undated) DEVICE — NEPTUNE 4 PORT MANIFOLD - (20/PK)

## (undated) DEVICE — TOWEL STOP TIMEOUT SAFETY FLAG (40EA/CA)

## (undated) DEVICE — SUTURE GENERAL